# Patient Record
Sex: MALE | Race: OTHER | HISPANIC OR LATINO | URBAN - METROPOLITAN AREA
[De-identification: names, ages, dates, MRNs, and addresses within clinical notes are randomized per-mention and may not be internally consistent; named-entity substitution may affect disease eponyms.]

---

## 2018-12-03 ENCOUNTER — EMERGENCY (EMERGENCY)
Facility: HOSPITAL | Age: 63
LOS: 1 days | Discharge: HOME | End: 2018-12-03
Attending: EMERGENCY MEDICINE
Payer: SELF-PAY

## 2018-12-03 VITALS
WEIGHT: 149.03 LBS | OXYGEN SATURATION: 98 % | HEIGHT: 64 IN | RESPIRATION RATE: 18 BRPM | SYSTOLIC BLOOD PRESSURE: 161 MMHG | HEART RATE: 80 BPM | TEMPERATURE: 98 F | DIASTOLIC BLOOD PRESSURE: 87 MMHG

## 2018-12-03 PROCEDURE — 90715 TDAP VACCINE 7 YRS/> IM: CPT

## 2018-12-03 PROCEDURE — 99283 EMERGENCY DEPT VISIT LOW MDM: CPT | Mod: 25

## 2018-12-03 PROCEDURE — 12002 RPR S/N/AX/GEN/TRNK2.6-7.5CM: CPT

## 2018-12-03 PROCEDURE — 90471 IMMUNIZATION ADMIN: CPT

## 2018-12-03 PROCEDURE — T1013: CPT

## 2018-12-03 RX ORDER — TETANUS TOXOID, REDUCED DIPHTHERIA TOXOID AND ACELLULAR PERTUSSIS VACCINE, ADSORBED 5; 2.5; 8; 8; 2.5 [IU]/.5ML; [IU]/.5ML; UG/.5ML; UG/.5ML; UG/.5ML
0.5 SUSPENSION INTRAMUSCULAR ONCE
Qty: 0 | Refills: 0 | Status: COMPLETED | OUTPATIENT
Start: 2018-12-03 | End: 2018-12-03

## 2018-12-03 RX ADMIN — TETANUS TOXOID, REDUCED DIPHTHERIA TOXOID AND ACELLULAR PERTUSSIS VACCINE, ADSORBED 0.5 MILLILITER(S): 5; 2.5; 8; 8; 2.5 SUSPENSION INTRAMUSCULAR at 11:11

## 2018-12-03 NOTE — ED STATDOCS - PROGRESS NOTE DETAILS
history and physical performed by intake physician - results reviewed and case discussed with attending   lac repair, tetanus shot

## 2018-12-03 NOTE — ED STATDOCS - NS_ ATTENDINGSCRIBEDETAILS _ED_A_ED_FT
I, Triston Leyva, performed the initial face to face bedside interview with this patient regarding history of present illness, review of symptoms and relevant past medical, social and family history.  I completed an independent physical examination.  I was the provider who initially evaluated this patient.  The history, relevant review of systems, past medical and surgical history, medical decision making, and physical examination was documented by the scribe in my presence and I attest to the accuracy of the documentation. Follow-up on ordered tests (ie labs, radiologic studies) and re-evaluation of the patient's status has been communicated to the ACP.  Disposition of the patient will be based on test outcome and response to ED interventions.

## 2018-12-03 NOTE — ED STATDOCS - OBJECTIVE STATEMENT
62 y/o M pt presents to the ED c/o laceration. Pt was using a knife at work today when he cut his finger. Pt denies fever and chills. No further complaints at this time. 62 y/o M pt presents to the ED c/o laceration. Pt was using a knife at work today when he cut his thumb. He was cutting boxes at work. he is right-handed. Tetanus not UTD. Pt denies fever and chills. No further complaints at this time.    : Kasey 64 y/o M pt presents to the ED c/o laceration.  cut his hand while opening boxes.  Right-hand dominant. Tetanus not UTD. No further complaints at this time.    : Kasey

## 2020-11-17 ENCOUNTER — INPATIENT (INPATIENT)
Facility: HOSPITAL | Age: 65
LOS: 1 days | Discharge: HOME | DRG: 66 | End: 2020-11-19
Attending: HOSPITALIST | Admitting: HOSPITALIST
Payer: MEDICARE

## 2020-11-17 VITALS
WEIGHT: 179.9 LBS | RESPIRATION RATE: 20 BRPM | HEIGHT: 64 IN | SYSTOLIC BLOOD PRESSURE: 161 MMHG | OXYGEN SATURATION: 98 % | DIASTOLIC BLOOD PRESSURE: 83 MMHG | HEART RATE: 73 BPM | TEMPERATURE: 99 F

## 2020-11-17 DIAGNOSIS — Z98.890 OTHER SPECIFIED POSTPROCEDURAL STATES: Chronic | ICD-10-CM

## 2020-11-17 DIAGNOSIS — I63.9 CEREBRAL INFARCTION, UNSPECIFIED: ICD-10-CM

## 2020-11-17 LAB
A1C WITH ESTIMATED AVERAGE GLUCOSE RESULT: 5.7 % — HIGH (ref 4–5.6)
ALBUMIN SERPL ELPH-MCNC: 4.5 G/DL — SIGNIFICANT CHANGE UP (ref 3.3–5.2)
ALP SERPL-CCNC: 135 U/L — HIGH (ref 40–120)
ALT FLD-CCNC: 40 U/L — SIGNIFICANT CHANGE UP
ANION GAP SERPL CALC-SCNC: 10 MMOL/L — SIGNIFICANT CHANGE UP (ref 5–17)
APTT BLD: 36.6 SEC — HIGH (ref 27.5–35.5)
AST SERPL-CCNC: 32 U/L — SIGNIFICANT CHANGE UP
BASOPHILS # BLD AUTO: 0.03 K/UL — SIGNIFICANT CHANGE UP (ref 0–0.2)
BASOPHILS NFR BLD AUTO: 0.5 % — SIGNIFICANT CHANGE UP (ref 0–2)
BILIRUB SERPL-MCNC: 0.6 MG/DL — SIGNIFICANT CHANGE UP (ref 0.4–2)
BLD GP AB SCN SERPL QL: SIGNIFICANT CHANGE UP
BUN SERPL-MCNC: 21 MG/DL — HIGH (ref 8–20)
CALCIUM SERPL-MCNC: 9 MG/DL — SIGNIFICANT CHANGE UP (ref 8.6–10.2)
CHLORIDE SERPL-SCNC: 104 MMOL/L — SIGNIFICANT CHANGE UP (ref 98–107)
CO2 SERPL-SCNC: 24 MMOL/L — SIGNIFICANT CHANGE UP (ref 22–29)
CREAT SERPL-MCNC: 0.67 MG/DL — SIGNIFICANT CHANGE UP (ref 0.5–1.3)
EOSINOPHIL # BLD AUTO: 0.12 K/UL — SIGNIFICANT CHANGE UP (ref 0–0.5)
EOSINOPHIL NFR BLD AUTO: 2.2 % — SIGNIFICANT CHANGE UP (ref 0–6)
ESTIMATED AVERAGE GLUCOSE: 117 MG/DL — HIGH (ref 68–114)
GLUCOSE BLDC GLUCOMTR-MCNC: 114 MG/DL — HIGH (ref 70–99)
GLUCOSE SERPL-MCNC: 123 MG/DL — HIGH (ref 70–99)
HCT VFR BLD CALC: 46.2 % — SIGNIFICANT CHANGE UP (ref 39–50)
HGB BLD-MCNC: 16 G/DL — SIGNIFICANT CHANGE UP (ref 13–17)
IMM GRANULOCYTES NFR BLD AUTO: 0.4 % — SIGNIFICANT CHANGE UP (ref 0–1.5)
INR BLD: 1.04 RATIO — SIGNIFICANT CHANGE UP (ref 0.88–1.16)
LYMPHOCYTES # BLD AUTO: 1.99 K/UL — SIGNIFICANT CHANGE UP (ref 1–3.3)
LYMPHOCYTES # BLD AUTO: 35.7 % — SIGNIFICANT CHANGE UP (ref 13–44)
MCHC RBC-ENTMCNC: 30.5 PG — SIGNIFICANT CHANGE UP (ref 27–34)
MCHC RBC-ENTMCNC: 34.6 GM/DL — SIGNIFICANT CHANGE UP (ref 32–36)
MCV RBC AUTO: 88 FL — SIGNIFICANT CHANGE UP (ref 80–100)
MONOCYTES # BLD AUTO: 0.5 K/UL — SIGNIFICANT CHANGE UP (ref 0–0.9)
MONOCYTES NFR BLD AUTO: 9 % — SIGNIFICANT CHANGE UP (ref 2–14)
NEUTROPHILS # BLD AUTO: 2.91 K/UL — SIGNIFICANT CHANGE UP (ref 1.8–7.4)
NEUTROPHILS NFR BLD AUTO: 52.2 % — SIGNIFICANT CHANGE UP (ref 43–77)
PLATELET # BLD AUTO: 183 K/UL — SIGNIFICANT CHANGE UP (ref 150–400)
POTASSIUM SERPL-MCNC: 4.3 MMOL/L — SIGNIFICANT CHANGE UP (ref 3.5–5.3)
POTASSIUM SERPL-SCNC: 4.3 MMOL/L — SIGNIFICANT CHANGE UP (ref 3.5–5.3)
PROT SERPL-MCNC: 7.7 G/DL — SIGNIFICANT CHANGE UP (ref 6.6–8.7)
PROTHROM AB SERPL-ACNC: 12 SEC — SIGNIFICANT CHANGE UP (ref 10.6–13.6)
RBC # BLD: 5.25 M/UL — SIGNIFICANT CHANGE UP (ref 4.2–5.8)
RBC # FLD: 13.4 % — SIGNIFICANT CHANGE UP (ref 10.3–14.5)
SARS-COV-2 RNA SPEC QL NAA+PROBE: SIGNIFICANT CHANGE UP
SODIUM SERPL-SCNC: 138 MMOL/L — SIGNIFICANT CHANGE UP (ref 135–145)
TROPONIN T SERPL-MCNC: <0.01 NG/ML — SIGNIFICANT CHANGE UP (ref 0–0.06)
WBC # BLD: 5.57 K/UL — SIGNIFICANT CHANGE UP (ref 3.8–10.5)
WBC # FLD AUTO: 5.57 K/UL — SIGNIFICANT CHANGE UP (ref 3.8–10.5)

## 2020-11-17 PROCEDURE — 99223 1ST HOSP IP/OBS HIGH 75: CPT

## 2020-11-17 PROCEDURE — 99291 CRITICAL CARE FIRST HOUR: CPT

## 2020-11-17 PROCEDURE — 0042T: CPT

## 2020-11-17 PROCEDURE — 70496 CT ANGIOGRAPHY HEAD: CPT | Mod: 26

## 2020-11-17 PROCEDURE — 70498 CT ANGIOGRAPHY NECK: CPT | Mod: 26

## 2020-11-17 PROCEDURE — 93010 ELECTROCARDIOGRAM REPORT: CPT

## 2020-11-17 RX ORDER — GLUCAGON INJECTION, SOLUTION 0.5 MG/.1ML
1 INJECTION, SOLUTION SUBCUTANEOUS ONCE
Refills: 0 | Status: DISCONTINUED | OUTPATIENT
Start: 2020-11-17 | End: 2020-11-19

## 2020-11-17 RX ORDER — INSULIN LISPRO 100/ML
VIAL (ML) SUBCUTANEOUS
Refills: 0 | Status: DISCONTINUED | OUTPATIENT
Start: 2020-11-17 | End: 2020-11-19

## 2020-11-17 RX ORDER — DEXTROSE 50 % IN WATER 50 %
25 SYRINGE (ML) INTRAVENOUS ONCE
Refills: 0 | Status: DISCONTINUED | OUTPATIENT
Start: 2020-11-17 | End: 2020-11-19

## 2020-11-17 RX ORDER — ASPIRIN/CALCIUM CARB/MAGNESIUM 324 MG
325 TABLET ORAL DAILY
Refills: 0 | Status: DISCONTINUED | OUTPATIENT
Start: 2020-11-17 | End: 2020-11-17

## 2020-11-17 RX ORDER — SODIUM CHLORIDE 9 MG/ML
1000 INJECTION, SOLUTION INTRAVENOUS
Refills: 0 | Status: DISCONTINUED | OUTPATIENT
Start: 2020-11-17 | End: 2020-11-19

## 2020-11-17 RX ORDER — ATORVASTATIN CALCIUM 80 MG/1
80 TABLET, FILM COATED ORAL AT BEDTIME
Refills: 0 | Status: DISCONTINUED | OUTPATIENT
Start: 2020-11-17 | End: 2020-11-19

## 2020-11-17 RX ORDER — ASPIRIN/CALCIUM CARB/MAGNESIUM 324 MG
325 TABLET ORAL ONCE
Refills: 0 | Status: DISCONTINUED | OUTPATIENT
Start: 2020-11-17 | End: 2020-11-17

## 2020-11-17 RX ORDER — ASPIRIN/CALCIUM CARB/MAGNESIUM 324 MG
81 TABLET ORAL DAILY
Refills: 0 | Status: DISCONTINUED | OUTPATIENT
Start: 2020-11-17 | End: 2020-11-19

## 2020-11-17 RX ORDER — ACETAMINOPHEN 500 MG
650 TABLET ORAL EVERY 6 HOURS
Refills: 0 | Status: DISCONTINUED | OUTPATIENT
Start: 2020-11-17 | End: 2020-11-19

## 2020-11-17 RX ORDER — CLOPIDOGREL BISULFATE 75 MG/1
75 TABLET, FILM COATED ORAL DAILY
Refills: 0 | Status: DISCONTINUED | OUTPATIENT
Start: 2020-11-17 | End: 2020-11-19

## 2020-11-17 RX ORDER — INFLUENZA VIRUS VACCINE 15; 15; 15; 15 UG/.5ML; UG/.5ML; UG/.5ML; UG/.5ML
0.5 SUSPENSION INTRAMUSCULAR ONCE
Refills: 0 | Status: COMPLETED | OUTPATIENT
Start: 2020-11-17 | End: 2020-11-17

## 2020-11-17 RX ORDER — DEXTROSE 50 % IN WATER 50 %
15 SYRINGE (ML) INTRAVENOUS ONCE
Refills: 0 | Status: DISCONTINUED | OUTPATIENT
Start: 2020-11-17 | End: 2020-11-19

## 2020-11-17 RX ORDER — DEXTROSE 50 % IN WATER 50 %
12.5 SYRINGE (ML) INTRAVENOUS ONCE
Refills: 0 | Status: DISCONTINUED | OUTPATIENT
Start: 2020-11-17 | End: 2020-11-19

## 2020-11-17 RX ADMIN — ATORVASTATIN CALCIUM 80 MILLIGRAM(S): 80 TABLET, FILM COATED ORAL at 21:16

## 2020-11-17 RX ADMIN — CLOPIDOGREL BISULFATE 75 MILLIGRAM(S): 75 TABLET, FILM COATED ORAL at 19:20

## 2020-11-17 RX ADMIN — Medication 81 MILLIGRAM(S): at 21:16

## 2020-11-17 NOTE — ED PROVIDER NOTE - ATTENDING CONTRIBUTION TO CARE
The patient seen and examined    Stroke    I, Billy Wick, performed the initial face to face bedside interview with this patient regarding history of present illness, review of symptoms and relevant past medical, social and family history.  I completed an independent physical examination.  I was the initial provider who evaluated this patient. I have signed out the follow up of any pending tests (i.e. labs, radiological studies) to the resident.  I have communicated the patient’s plan of care and disposition with the resident.

## 2020-11-17 NOTE — H&P ADULT - NSHPREVIEWOFSYSTEMS_GEN_ALL_CORE
REVIEW OF SYSTEMS:    CONSTITUTIONAL: No fever, weight loss, or fatigue  EYES: No eye pain, visual disturbances, or discharge  ENMT: left facial droop   NECK: No pain or stiffness  BREASTS: No pain, masses, or nipple discharge  RESPIRATORY: No cough, wheezing, chills or hemoptysis; No shortness of breath  CARDIOVASCULAR: No chest pain, palpitations, dizziness, or leg swelling  GASTROINTESTINAL: No abdominal or epigastric pain. No nausea, vomiting, or hematemesis; No diarrhea or constipation. No melena or hematochezia.  GENITOURINARY: No dysuria, frequency, hematuria, or incontinence  NEUROLOGICAL: No headaches, memory loss, loss of strength, numbness, or tremors  SKIN: No itching, burning, rashes, or lesions   LYMPH NODES: No enlarged glands  ENDOCRINE: No heat or cold intolerance; No hair loss  MUSCULOSKELETAL: No joint pain or swelling; No muscle, back, or extremity pain  PSYCHIATRIC: No depression, anxiety, mood swings, or difficulty sleeping  HEME/LYMPH: No easy bruising, or bleeding gums  ALLERY AND IMMUNOLOGIC: No hives or eczema

## 2020-11-17 NOTE — CONSULT NOTE ADULT - SUBJECTIVE AND OBJECTIVE BOX
CHIEF COMPLAINT: rightr facial droop    HPI: 65yMale  · HPI Objective Statement: The patient is a 65 year old male presents with right sided facial droop and dysarthria and unable to swallow since 6:30 AM today.  	Not on any anticoagulation medications  	No HA, No CP, No SOB, No abd pain  To me he reports  symptoms began last and noted when he got up this am that the Right side of his face was drroping, lips wer weak  No sensation changes. No involvement of the limbs    PAST MEDICAL & SURGICAL HISTORY:  No pertinent past medical history      MEDICATIONS  (STANDING):  aspirin 325 milliGRAM(s) Oral Once  atorvastatin 80 milliGRAM(s) Oral at bedtime  dextrose 40% Gel 15 Gram(s) Oral once  dextrose 5%. 1000 milliLiter(s) (50 mL/Hr) IV Continuous <Continuous>  dextrose 5%. 1000 milliLiter(s) (100 mL/Hr) IV Continuous <Continuous>  dextrose 50% Injectable 25 Gram(s) IV Push once  dextrose 50% Injectable 12.5 Gram(s) IV Push once  dextrose 50% Injectable 25 Gram(s) IV Push once  glucagon  Injectable 1 milliGRAM(s) IntraMuscular once  insulin lispro (ADMELOG) corrective regimen sliding scale   SubCutaneous three times a day before meals    MEDICATIONS  (PRN):    Allergies    No Known Allergies    Intolerances        FAMILY HISTORY:          SOCIAL HISTORY:    Tobacco:  no  Alcohol:  no  Drugs:  no        REVIEW OF SYSTEMS:    Relevant systems are negative except as noted in the chart, HPI, and PMH      VITAL SIGNS:  Vital Signs Last 24 Hrs  T(C): 37.2 (17 Nov 2020 13:38), Max: 37.2 (17 Nov 2020 13:38)  T(F): 98.9 (17 Nov 2020 13:38), Max: 98.9 (17 Nov 2020 13:38)  HR: 77 (17 Nov 2020 14:03) (73 - 77)  BP: 159/89 (17 Nov 2020 14:03) (154/79 - 161/83)  BP(mean): --  RR: 18 (17 Nov 2020 14:03) (18 - 20)  SpO2: 96% (17 Nov 2020 14:03) (96% - 98%)    PHYSICAL EXAMINATION:    General: Well-developed, well nourished, in no acute distress.  Cardiac:  Regular rate and rhythm. No carotid bruits appreciated.  Eyes: Fundoscopic examination was deferred.  Neurologic:  - Mental Status:  Alert, awake, oriented to person, place, and time; Speech is fluent. Language is normal. Follows commands well.  Insight and knowledge appear appropriate.  Cranial Nerves II-XII:    II:  Visual acuity is normal for age ; Visual fields are full to confrontation; Pupils are equal, round, and reactive to light.  III, IV, VI:  Extraocular movements are intact without nystagmus.  V:  Facial sensation is intact in the V1-V3 distribution bilaterally.  VII:  Face waekness of the right side of the lips. Mild NLF , mild forehead weakness  VIII:  Hearing is grossly intact  IX, X, XII:  speech is clear  XI:  Head turning and shoulder shrug are intact.  - Motor:  Strength is 5/5 x 4.   There is no pronator drift. .  - Reflexes:  2+ and symmetric at the knees.  Plantar responses flexor.  - Sensory:  Symmetric to light touch  - Coordination:  Finger-nose-finger is normal. Rapid alternating hand and foot  movements are intact. Dexterity appears normal      LABS:                          16.0   5.57  )-----------( 183      ( 17 Nov 2020 13:20 )             46.2     17 Nov 2020 13:20    138    |  104    |  21.0   ----------------------------<  123    4.3     |  24.0   |  0.67     Ca    9.0        17 Nov 2020 13:20    TPro  7.7    /  Alb  4.5    /  TBili  0.6    /  DBili  x      /  AST  32     /  ALT  40     /  AlkPhos  135    17 Nov 2020 13:20    LIVER FUNCTIONS - ( 17 Nov 2020 13:20 )  Alb: 4.5 g/dL / Pro: 7.7 g/dL / ALK PHOS: 135 U/L / ALT: 40 U/L / AST: 32 U/L / GGT: x           PT/INR - ( 17 Nov 2020 13:20 )   PT: 12.0 sec;   INR: 1.04 ratio         PTT - ( 17 Nov 2020 13:20 )  PTT:36.6 sec      RADIOLOGY & ADDITIONAL STUDIES:      < from: CT Brain Stroke Protocol (11.17.20 @ 13:21) >  CT angiography:    NECK:  The aortic arch is conventional in anatomy. Origin of supraaortic arteries are patent. The common carotid arteries are normal in course and caliber. There are atherosclerotic plaques at common carotid bifurcations and carotid bulbs without evidence of significant segmental stenosis.    The right internal carotid artery is normal in caliber. There is 0 % stenosis using NASCET criteria (normal right ICA caliber is 4.4 mm).    The left internal carotid artery is normal in caliber. There is 0 % stenosis using NASCET criteria (normal left ICA caliber is 4.3 mm).    Bilateral vertebral arteries are normal in course, caliber and contour, without evidence of dissection or occlusion.    Degenerative changes of the bony cervical spine are noted.    BRAIN:    The petrocavernous and supraclinoid ICA segments are normal in caliber. There is high-grade narrowing/occlusion of the entire right MCA from the carotid terminus. The visualized left MCA and IVANNA branches are normal without evidence of high-grade stenosis or aneurysm. There is mild narrowing of the left MCA. There is mild to moderate narrowing of the proximal left M2 branchat the bifurcation. There is mild narrowing of the left pericallosal A2. The ACOM is present. Bilateral posterior communicating arteries are present.  The vertebral arteries, visualized cerebellar arteries, basilar and bilateral posterior cerebral arteries are patent without evidence of stenoses or aneurysm.    Other findings:    CT Perfusion:    The regional cerebral blood flow (CBF), and time to maximum (Tmax) for the IVANNA, MCA, and PCA territories are within normal limits. There is asymmetric increased regional cerebral blood volume in the right operculum compared to the left. There is no evidence of asymmetric or delayed territorial perfusion. The arteriovenous curves demonstrate low peak and slope the arterial curve, probably secondary toplacement in the M2 branch of the left MCA rather than in the more proximal left MCA circulation.      IMPRESSION:    CT brain: There is suspicion for an acute infarct in the right subinsular cortex and right gangliocapsular region. No intracranial hemorrhage, midline shift or herniation.    < end of copied text >    IMPRESSION:    Exam is consistent with a Right [place] Palsy.  CT findings seem to be coincidental with Right MCA occulsion and ??right ischemia.      PLAN:  1. Needs MRI brain with and without rani- r/o cva. bells palsy  2. Needs MRA to  confirm rt MCA findings' on CT  3. Consider course of steroids for the [place] Palsty  4. Antiplatelet therapy as prescribed.  5. Cerebrovascular risk factor assessment and management  6. Medical and Cardiac evaluation and treatment as indicated

## 2020-11-17 NOTE — ED PROVIDER NOTE - CLINICAL SUMMARY MEDICAL DECISION MAKING FREE TEXT BOX
The patient is a 65 year old male presents with right sided facial droop, will get CT and stroke workup.

## 2020-11-17 NOTE — CONSULT NOTE ADULT - SUBJECTIVE AND OBJECTIVE BOX
HPI: This is  a 65y Male with PMH of HLD, presented to ED w cc  of right sided facial droop and a feeling of lip tingling that started this morning ~ 6:30 AM while he was shaving. ED reported he had difficulties swallowing and dysarthria but both patient and wife deny this to me. CTH showed suspicion for acute infarct in the right subinsular cortex and right ganglio capsular region. Neuro IR team consulted for reading of possible R M1 occlusion on CTA.  CT perfusion scan wnl.  At the time of my exam, facial droop has resolved, patient has no sensory deficit, reports lip feels "weak" on right side.       PAST MEDICAL & SURGICAL HISTORY:  Hyperlipidemia    H/O shoulder surgery    FAMILY HISTORY:  FH: hypertension  father    SOCIAL HISTORY:  Tobacco Use:  EtOH use:   Substance:    Allergies    No Known Allergies    REVIEW OF SYSTEMS  CONSTITUTIONAL: No fever, weight loss, or fatigue  EYES: No eye pain, visual disturbances, or discharge  ENMT:  Lip "weakness" No difficulty hearing, tinnitus, vertigo; No sinus or throat pain  NECK: No pain or stiffness  BREASTS: No pain, masses, or nipple discharge  RESPIRATORY: No cough, wheezing, chills or hemoptysis; No shortness of breath  CARDIOVASCULAR: No chest pain, palpitations, dizziness, or leg swelling  GASTROINTESTINAL: No abdominal or epigastric pain. No nausea, vomiting, or hematemesis; No diarrhea or constipation. No melena or hematochezia.  GENITOURINARY: No dysuria, frequency, hematuria, or incontinence  NEUROLOGICAL: No headaches, memory loss, loss of strength, numbness, or tremors  SKIN: No itching, burning, rashes, or lesions   LYMPH NODES: No enlarged glands  ENDOCRINE: No heat or cold intolerance; No hair loss  MUSCULOSKELETAL: No joint pain or swelling; No muscle, back, or extremity pain  PSYCHIATRIC: No depression, anxiety, mood swings, or difficulty sleeping  HEME/LYMPH: No easy bruising, or bleeding gums  ALLERY AND IMMUNOLOGIC: No hives or eczema    HOME MEDICATIONS:  atorvastatin 40 mg oral tablet: 1 tab(s) orally once a day (at bedtime) (17 Nov 2020 14:18)    Neuro:  acetaminophen   Tablet .. 650 milliGRAM(s) Oral every 6 hours PRN  aspirin 325 milliGRAM(s) Oral daily    Anticoagulation:    OTHER:  atorvastatin 80 milliGRAM(s) Oral at bedtime  dextrose 40% Gel 15 Gram(s) Oral once  dextrose 50% Injectable 25 Gram(s) IV Push once  dextrose 50% Injectable 12.5 Gram(s) IV Push once  dextrose 50% Injectable 25 Gram(s) IV Push once  glucagon  Injectable 1 milliGRAM(s) IntraMuscular once  insulin lispro (ADMELOG) corrective regimen sliding scale   SubCutaneous three times a day before meals  predniSONE   Tablet 60 milliGRAM(s) Oral daily      Vital Signs Last 24 Hrs  T(C): 37.2 (17 Nov 2020 13:38), Max: 37.2 (17 Nov 2020 13:38)  T(F): 98.9 (17 Nov 2020 13:38), Max: 98.9 (17 Nov 2020 13:38)  HR: 77 (17 Nov 2020 14:03) (73 - 77)  BP: 159/89 (17 Nov 2020 14:03) (154/79 - 161/83)  BP(mean): --  RR: 18 (17 Nov 2020 14:03) (18 - 20)  SpO2: 96% (17 Nov 2020 14:03) (96% - 98%)      PHYSICAL EXAM:  1A: Level of consciousness       0= Alert; keenly responsive         1B: Ask month and age       0= Both questions right       1C: "Blink eyes" and "Squeeze Hands"       0= Performs both         2: Horizontal EOMs       0= Normal        3: Visual fields       0= No visual loss       4: Facial palsy (use grimace if obtunded)       0= Normal symmetry         5A: Left arm motor drift (count out loud and use fingers to show count)       0= No drift x 10 seconds       5B: Right arm motor drift       0= No drift x 10 seconds         6A: Left leg motor drift       0= No drift x 10 seconds        6B: Right leg motor drift       0= No drift x 10 seconds        7: Limb ataxia (FNF/heel-shin)       0= No ataxia        8: Sensation       0= Normal, no sensory loss        9: Language/aphasia- describe the scene (on christophe); name the items; read the sentences (on christophe)       0= Normal, no aphasia         10: Dysarthria- read the words       0= Normal         11: Extinction/inattention       0= No abnormality         Total Score: 0     Detailed Neurologic Exam:    Mental status: The patient is awake and alert and has normal attention span.  The patient is fully oriented in 3 spheres. The patient is oriented to current events. The patient is able to name objects, follow commands, repeat sentences.  Cranial nerves: Pupils equal and react symmetrically to light. There is no visual field deficit to confrontation. Extraocular motion is full with no nystagmus. There is no ptosis. Facial sensation is intact. Facial musculature is symmetric. Palate elevates symmetrically. Shoulder shrug is normal. Tongue is midline.  Motor: There is normal bulk and tone.  There is no tremor.  Strength is 5/5 in the right arm and leg.   Strength is 5/5 in the left arm and leg.  Sensation: Intact to light touch in 4 extremities.l  Cerebellar: There is no dysmetria on finger to nose testing.  Gait : deferred    GENERAL: NAD, well-groomed  HEAD:  Atraumatic, normocephalic  DRAINS:   WOUND: Dressing clean dry intact; well healed  SHUNT: easily compressible and refills  EYES: Conjunctiva and sclera clear; corneal reflex intact  ENMT: No tonsillar erythema, exudates, or enlargement; moist mucous membranes, good dentition, no lesions  NECK: Supple, no JVD, dormal thyroid  CHEST/LUNG: Clear to auscultation bilaterally; no rales, rhonchi, wheezing, or rubs  HEART: +S1/+S2; Regular rate and rhythm; no murmurs, rubs, or gallops  ABDOMEN: Soft, nontender, nondistended; bowel sounds present all four quadrants  EXTREMITIES:  2+ peripheral pulses, no clubbing, cyanosis, or edema  LYMPH: No lymphadenopathy noted  SKIN: Warm, dry; no rashes or lesions    LABS:                        16.0   5.57  )-----------( 183      ( 17 Nov 2020 13:20 )             46.2     11-17    138  |  104  |  21.0<H>  ----------------------------<  123<H>  4.3   |  24.0  |  0.67    Ca    9.0      17 Nov 2020 13:20    TPro  7.7  /  Alb  4.5  /  TBili  0.6  /  DBili  x   /  AST  32  /  ALT  40  /  AlkPhos  135<H>  11-17    PT/INR - ( 17 Nov 2020 13:20 )   PT: 12.0 sec;   INR: 1.04 ratio         PTT - ( 17 Nov 2020 13:20 )  PTT:36.6 sec        RADIOLOGY & ADDITIONAL STUDIES:      < from: CT Brain Stroke Protocol (11.17.20 @ 13:21) >    IMPRESSION:    CT brain: There is suspicion for an acute infarct in the right subinsular cortex and right gangliocapsular region. No intracranial hemorrhage, midline shift or herniation.    Right ASPECT: 8    Left ASPECT: 10    CTA brain: There is high-grade narrowing/occlusion of the right MCA just distal to terminus. There is relative preservation of several distal right M2 branches.      < end of copied text >

## 2020-11-17 NOTE — ED PROVIDER NOTE - PSYCHIATRIC, MLM
Total Body Time: 216 Alert and oriented to person, place, time/situation. normal mood and affect. no apparent risk to self or others.

## 2020-11-17 NOTE — ED ADULT TRIAGE NOTE - CHIEF COMPLAINT QUOTE
Pt arrives to ED c/o R facial drop and numbness started approx. 0630 this morning Code Stroke initiated

## 2020-11-17 NOTE — CONSULT NOTE ADULT - SUBJECTIVE AND OBJECTIVE BOX
[facility]                                   [address] [place] [address]                                                      PHONE: [phone]                                                         FAX: [phone]  [email or web address]  ---------------------------------------------------------------------------------------------------------------------------------    Reason for Consult: CVA     HPI:  ASHLEY GAXIOLA is an 65y Male with history of HLD none smoker who presents to ED C/O of right sided facial droop and dysarthria that started this morning ~ 6:30 AM.  Patient had a difficulties time swallowing also.  The Patient well and denies any chest pain orthopnea or PND.      PAST MEDICAL & SURGICAL HISTORY:  Hyperlipidemia    H/O shoulder surgery        No Known Allergies      MEDICATIONS  (STANDING):  aspirin 325 milliGRAM(s) Oral daily  atorvastatin 80 milliGRAM(s) Oral at bedtime  dextrose 40% Gel 15 Gram(s) Oral once  dextrose 5%. 1000 milliLiter(s) (50 mL/Hr) IV Continuous <Continuous>  dextrose 5%. 1000 milliLiter(s) (100 mL/Hr) IV Continuous <Continuous>  dextrose 50% Injectable 25 Gram(s) IV Push once  dextrose 50% Injectable 12.5 Gram(s) IV Push once  dextrose 50% Injectable 25 Gram(s) IV Push once  glucagon  Injectable 1 milliGRAM(s) IntraMuscular once  insulin lispro (ADMELOG) corrective regimen sliding scale   SubCutaneous three times a day before meals  predniSONE   Tablet 60 milliGRAM(s) Oral daily    MEDICATIONS  (PRN):  acetaminophen   Tablet .. 650 milliGRAM(s) Oral every 6 hours PRN Temp greater or equal to 38C (100.4F), Mild Pain (1 - 3)      Social History:  Cigarettes:           none          Alchohol:      none            Illicit Drug Abuse:  none     ROS: Negative other than as mentioned in HPI.    Vital Signs Last 24 Hrs  T(C): 37.2 (17 Nov 2020 13:38), Max: 37.2 (17 Nov 2020 13:38)  T(F): 98.9 (17 Nov 2020 13:38), Max: 98.9 (17 Nov 2020 13:38)  HR: 77 (17 Nov 2020 14:03) (73 - 77)  BP: 159/89 (17 Nov 2020 14:03) (154/79 - 161/83)  BP(mean): --  RR: 18 (17 Nov 2020 14:03) (18 - 20)  SpO2: 96% (17 Nov 2020 14:03) (96% - 98%)  ICU Vital Signs Last 24 Hrs  ASHLEY VERA  I&O's Detail    I&O's Summary    Drug Dosing Weight  ASHLEY VERA      PHYSICAL EXAM:  General: Appears well developed, well nourished alert and cooperative.  HEENT: Head; normocephalic, atraumatic.  Eyes: Pupils reactive, cornea wnl.  Neck: Supple, no nodes adenopathy, no NVD or carotid bruit or thyromegaly.  CARDIOVASCULAR: Normal S1 and S2, No murmur, rub, gallop or lift.   LUNGS: No rales, rhonchi or wheeze. Normal breath sounds bilaterally.  ABDOMEN: Soft, nontender without mass or organomegaly. bowel sounds normoactive.  EXTREMITIES: No clubbing, cyanosis or edema. Distal pulses wnl.   SKIN: warm and dry with normal turgor.  NEURO: Alert/oriented x 3/normal motor exam. No pathologic reflexes.    PSYCH: normal affect.        LABS:                        16.0   5.57  )-----------( 183      ( 17 Nov 2020 13:20 )             46.2     11-17    138  |  104  |  21.0<H>  ----------------------------<  123<H>  4.3   |  24.0  |  0.67    Ca    9.0      17 Nov 2020 13:20    TPro  7.7  /  Alb  4.5  /  TBili  0.6  /  DBili  x   /  AST  32  /  ALT  40  /  AlkPhos  135<H>  11-17    ASHLEY VERA  CARDIAC MARKERS ( 17 Nov 2020 13:20 )  x     / <0.01 ng/mL / x     / x     / x          PT/INR - ( 17 Nov 2020 13:20 )   PT: 12.0 sec;   INR: 1.04 ratio         PTT - ( 17 Nov 2020 13:20 )  PTT:36.6 sec    RADIOLOGY & ADDITIONAL STUDIES:    INTERPRETATION OF TELEMETRY (personally reviewed): NSR     ECG: Pending     ECHO: pending   IMPRESSION:    CT brain: There is suspicion for an acute infarct in the right subinsular cortex and right gangliocapsular region. No intracranial hemorrhage, midline shift or herniation.    Assessment and Plan:  In summary, ASHLEY GAXIOLA is an 65y Male with past medical history significant for history of HLD none smoker who presents to ED C/O of right sided facial droop and dysarthria that started this morning ~ 6:30 AM.  Patient had a difficulties time swallowing also.  The Patient well and denies any chest pain orthopnea or PND.  CT head showed suspicion for acute infrarct in the right subinsular cortex and right ganglio capsular region.     Plan  1.  Admit to TELE   2.  Check EKG   3.  Awaiting TTE to evaluate LV EF   4.  Awaiting brain MRI and MRA   5.  Neurology follow up   6.  May need COLIN/ILR pre discharge pending above findings

## 2020-11-17 NOTE — H&P ADULT - HISTORY OF PRESENT ILLNESS
65 yom with pmh of hld on statin presents from home with right facial droop and vague feelings of inability to move his upper lip area. Patient states the symptoms started early this am associated with numbness and tingling. Patient denies any other complaints and wife was at bedside who corroborated the story. Wife denies any confusion, dysarthria and weakness of her . Patient worked up in er and found to have a nih of 3 as per records and a likley stroke on the ct head. Patient is being admitted for further work up

## 2020-11-17 NOTE — ED ADULT NURSE NOTE - OBJECTIVE STATEMENT
CODE STROKE called and brought to CC. 66 y/o m a&ox3 comes to ED c/o rt. sided facial and lip numbness starting at 630am. pt. also c/o slurred speech. pt. denies chest pain or discomfort at this time. pt. in no apparent distress at this time, breathing even and unlabored.

## 2020-11-17 NOTE — CONSULT NOTE ADULT - ASSESSMENT
Assessment: This is  a 65y Male with PMH of HLD, presented to ED w cc  of right sided facial droop and a feeling of lip tingling that started this morning ~ 6:30 AM while he was shaving. ED reported he had difficulties swallowing and dysarthria but both patient and wife deny this to me. CTH showed suspicion for acute infarct in the right subinsular cortex and right ganglio capsular region. Neuro IR team consulted for reading of possible R M1 occlusion on CTA.  CT perfusion scan wnl.  At the time of my exam, facial droop has resolved, patient has no sensory deficit, reports lip feels "weak" on right side.            Plan:   --> Reviewed imaging with Dr Brown, patient has severe diffuse intracranial atherosclerotic disease, questionable RM1 occlusion appears to be chronic narrowing   --> Rec maximum medical therapy for intracranial stenosis which is ASA and Plavix X 3 months then repeat imaging as per [facility] Study  --> Patient admitted to hospitalist service for stroke work up  --> Cardiology and general neurology following  --> Stroke Core Measures- HgA1C, LDL, TTE  --> Awaiting brain MRI and MRA   --> May need COLIN/ILR pre discharge per cardiology  --> Will continue to follow

## 2020-11-17 NOTE — H&P ADULT - ASSESSMENT
#cva - admit to step down   --> asa, plavix and statin   - neuro, and neurointerventional note appreciated  -> mri mra head and neck ordered  --> Tte  --> send hba1c and lipid panel in a   --> pt/ot/speech    #dvt prop - ambulation and scds    #diet - soft  -> patient showing no signs of dysphagia    plan of care explained to patient and wife with

## 2020-11-17 NOTE — H&P ADULT - NSHPLABSRESULTS_GEN_ALL_CORE
16.0   5.57   )----------(  183       ( 17 Nov 2020 13:20 )               46.2      138    |  104    |  21.0   ----------------------------<  123        ( 17 Nov 2020 13:20 )  4.3     |  24.0   |  0.67     Ca    9.0        ( 17 Nov 2020 13:20 )    TPro  7.7    /  Alb  4.5    /  TBili  0.6    /  DBili  x      /  AST  32     /  ALT  40     /  AlkPhos  135    ( 17 Nov 2020 13:20 )    LIVER FUNCTIONS - ( 17 Nov 2020 13:20 )  Alb: 4.5 g/dL / Pro: 7.7 g/dL / ALK PHOS: 135 U/L / ALT: 40 U/L / AST: 32 U/L / GGT: x           PT/INR -  12.0 sec / 1.04 ratio   ( 17 Nov 2020 13:20 )       PTT -  36.6 sec   ( 17 Nov 2020 13:20 )  CAPILLARY BLOOD GLUCOSE    CARDIAC MARKERS ( 17 Nov 2020 13:20 )  x     / <0.01 ng/mL / x     / x     / x          ct head - CT brain: There is suspicion for an acute infarct in the right subinsular cortex and right gangliocapsular region. No intracranial hemorrhage, midline shift or herniation.    Right ASPECT: 8    Left ASPECT: 10    CTA brain: There is high-grade narrowing/occlusion of the right MCA just distal to terminus. There is relative preservation of several distal right M2 branches.

## 2020-11-17 NOTE — H&P ADULT - NSHPPHYSICALEXAM_GEN_ALL_CORE
PHYSICAL EXAM:    GENERAL: NAD, well-groomed, well-developed  HEAD:  Atraumatic, Normocephalic  EYES: EOMI, PERRLA, conjunctiva and sclera clear  ENMT: No tonsillar erythema, exudates, or enlargement; Moist mucous membranes, Good dentition, No lesions  NECK: Supple, No JVD, Normal thyroid  NERVOUS SYSTEM:  Alert & Oriented X3, Good concentration; Motor Strength 5/5 B/L upper and lower extremities; DTRs 2+ intact and symmetric  CHEST/LUNG: Clear to percussion bilaterally; No rales, rhonchi, wheezing, or rubs  HEART: Regular rate and rhythm; No murmurs, rubs, or gallops  ABDOMEN: Soft, Nontender, Nondistended; Bowel sounds present  EXTREMITIES:  2+ Peripheral Pulses, No clubbing, cyanosis, or edema  LYMPH: No lymphadenopathy noted  SKIN: No rashes or lesions PHYSICAL EXAM:    GENERAL: NAD, well-groomed, well-developed  HEAD:  Atraumatic, Normocephalic  EYES: EOMI, PERRLA, conjunctiva and sclera clear  ENMT: subtle right facial droop  NECK: Supple, No JVD, Normal thyroid  NERVOUS SYSTEM:  Alert & Oriented X3, Good concentration; Motor Strength 5/5 B/L upper and lower extremities; sensory intact   CHEST/LUNG: Clear to percussion bilaterally; No rales, rhonchi, wheezing, or rubs  HEART: Regular rate and rhythm; No murmurs, rubs, or gallops  ABDOMEN: Soft, Nontender, Nondistended; Bowel sounds present  EXTREMITIES:  2+ Peripheral Pulses, No clubbing, cyanosis, or edema  LYMPH: No lymphadenopathy noted  SKIN: No rashes or lesions

## 2020-11-17 NOTE — ED PROVIDER NOTE - OBJECTIVE STATEMENT
The patient is a 65 year old male presents with right sided facial droop and dysarthria and unable to swallow since 6:30 AM today. The patient is a 65 year old male presents with right sided facial droop and dysarthria and unable to swallow since 6:30 AM today.  Not on any anticoagulation medications  No HA, No CP, No SOB, No abd pain  No recent surgery

## 2020-11-18 LAB
A1C WITH ESTIMATED AVERAGE GLUCOSE RESULT: 5.9 % — HIGH (ref 4–5.6)
ALBUMIN SERPL ELPH-MCNC: 4.3 G/DL — SIGNIFICANT CHANGE UP (ref 3.3–5.2)
ALP SERPL-CCNC: 101 U/L — SIGNIFICANT CHANGE UP (ref 40–120)
ALT FLD-CCNC: 40 U/L — SIGNIFICANT CHANGE UP
ANION GAP SERPL CALC-SCNC: 9 MMOL/L — SIGNIFICANT CHANGE UP (ref 5–17)
AST SERPL-CCNC: 29 U/L — SIGNIFICANT CHANGE UP
B BURGDOR C6 AB SER-ACNC: NEGATIVE — SIGNIFICANT CHANGE UP
B BURGDOR IGG+IGM SER-ACNC: 0.07 INDEX — SIGNIFICANT CHANGE UP (ref 0.01–0.89)
BASOPHILS # BLD AUTO: 0.03 K/UL — SIGNIFICANT CHANGE UP (ref 0–0.2)
BASOPHILS NFR BLD AUTO: 0.5 % — SIGNIFICANT CHANGE UP (ref 0–2)
BILIRUB SERPL-MCNC: 0.9 MG/DL — SIGNIFICANT CHANGE UP (ref 0.4–2)
BUN SERPL-MCNC: 18 MG/DL — SIGNIFICANT CHANGE UP (ref 8–20)
CALCIUM SERPL-MCNC: 8.9 MG/DL — SIGNIFICANT CHANGE UP (ref 8.6–10.2)
CHLORIDE SERPL-SCNC: 102 MMOL/L — SIGNIFICANT CHANGE UP (ref 98–107)
CHOLEST SERPL-MCNC: 215 MG/DL — HIGH
CO2 SERPL-SCNC: 26 MMOL/L — SIGNIFICANT CHANGE UP (ref 22–29)
CREAT SERPL-MCNC: 0.65 MG/DL — SIGNIFICANT CHANGE UP (ref 0.5–1.3)
EOSINOPHIL # BLD AUTO: 0.14 K/UL — SIGNIFICANT CHANGE UP (ref 0–0.5)
EOSINOPHIL NFR BLD AUTO: 2.6 % — SIGNIFICANT CHANGE UP (ref 0–6)
ESTIMATED AVERAGE GLUCOSE: 123 MG/DL — HIGH (ref 68–114)
GLUCOSE BLDC GLUCOMTR-MCNC: 104 MG/DL — HIGH (ref 70–99)
GLUCOSE BLDC GLUCOMTR-MCNC: 121 MG/DL — HIGH (ref 70–99)
GLUCOSE BLDC GLUCOMTR-MCNC: 126 MG/DL — HIGH (ref 70–99)
GLUCOSE BLDC GLUCOMTR-MCNC: 135 MG/DL — HIGH (ref 70–99)
GLUCOSE BLDC GLUCOMTR-MCNC: 137 MG/DL — HIGH (ref 70–99)
GLUCOSE SERPL-MCNC: 103 MG/DL — HIGH (ref 70–99)
HCT VFR BLD CALC: 49 % — SIGNIFICANT CHANGE UP (ref 39–50)
HCV AB S/CO SERPL IA: 0.13 S/CO — SIGNIFICANT CHANGE UP (ref 0–0.99)
HCV AB SERPL-IMP: SIGNIFICANT CHANGE UP
HDLC SERPL-MCNC: 46 MG/DL — SIGNIFICANT CHANGE UP
HGB BLD-MCNC: 16.6 G/DL — SIGNIFICANT CHANGE UP (ref 13–17)
IMM GRANULOCYTES NFR BLD AUTO: 0.2 % — SIGNIFICANT CHANGE UP (ref 0–1.5)
LIPID PNL WITH DIRECT LDL SERPL: 127 MG/DL — HIGH
LYMPHOCYTES # BLD AUTO: 1.78 K/UL — SIGNIFICANT CHANGE UP (ref 1–3.3)
LYMPHOCYTES # BLD AUTO: 32.4 % — SIGNIFICANT CHANGE UP (ref 13–44)
MCHC RBC-ENTMCNC: 30.1 PG — SIGNIFICANT CHANGE UP (ref 27–34)
MCHC RBC-ENTMCNC: 33.9 GM/DL — SIGNIFICANT CHANGE UP (ref 32–36)
MCV RBC AUTO: 88.8 FL — SIGNIFICANT CHANGE UP (ref 80–100)
MONOCYTES # BLD AUTO: 0.54 K/UL — SIGNIFICANT CHANGE UP (ref 0–0.9)
MONOCYTES NFR BLD AUTO: 9.8 % — SIGNIFICANT CHANGE UP (ref 2–14)
NEUTROPHILS # BLD AUTO: 2.99 K/UL — SIGNIFICANT CHANGE UP (ref 1.8–7.4)
NEUTROPHILS NFR BLD AUTO: 54.5 % — SIGNIFICANT CHANGE UP (ref 43–77)
NON HDL CHOLESTEROL: 169 MG/DL — HIGH
PLATELET # BLD AUTO: 185 K/UL — SIGNIFICANT CHANGE UP (ref 150–400)
POTASSIUM SERPL-MCNC: 4.1 MMOL/L — SIGNIFICANT CHANGE UP (ref 3.5–5.3)
POTASSIUM SERPL-SCNC: 4.1 MMOL/L — SIGNIFICANT CHANGE UP (ref 3.5–5.3)
PROT SERPL-MCNC: 7.4 G/DL — SIGNIFICANT CHANGE UP (ref 6.6–8.7)
RBC # BLD: 5.52 M/UL — SIGNIFICANT CHANGE UP (ref 4.2–5.8)
RBC # FLD: 13.6 % — SIGNIFICANT CHANGE UP (ref 10.3–14.5)
SODIUM SERPL-SCNC: 137 MMOL/L — SIGNIFICANT CHANGE UP (ref 135–145)
TRIGL SERPL-MCNC: 208 MG/DL — HIGH
TSH SERPL-MCNC: 0.94 UIU/ML — SIGNIFICANT CHANGE UP (ref 0.27–4.2)
WBC # BLD: 5.49 K/UL — SIGNIFICANT CHANGE UP (ref 3.8–10.5)
WBC # FLD AUTO: 5.49 K/UL — SIGNIFICANT CHANGE UP (ref 3.8–10.5)

## 2020-11-18 PROCEDURE — 99233 SBSQ HOSP IP/OBS HIGH 50: CPT

## 2020-11-18 PROCEDURE — 70544 MR ANGIOGRAPHY HEAD W/O DYE: CPT | Mod: 26,59

## 2020-11-18 PROCEDURE — 70553 MRI BRAIN STEM W/O & W/DYE: CPT | Mod: 26

## 2020-11-18 PROCEDURE — 99232 SBSQ HOSP IP/OBS MODERATE 35: CPT

## 2020-11-18 PROCEDURE — 93010 ELECTROCARDIOGRAM REPORT: CPT

## 2020-11-18 PROCEDURE — 70549 MR ANGIOGRAPH NECK W/O&W/DYE: CPT | Mod: 26

## 2020-11-18 RX ADMIN — Medication 60 MILLIGRAM(S): at 05:20

## 2020-11-18 RX ADMIN — ATORVASTATIN CALCIUM 80 MILLIGRAM(S): 80 TABLET, FILM COATED ORAL at 22:35

## 2020-11-18 RX ADMIN — Medication 81 MILLIGRAM(S): at 13:05

## 2020-11-18 RX ADMIN — CLOPIDOGREL BISULFATE 75 MILLIGRAM(S): 75 TABLET, FILM COATED ORAL at 13:05

## 2020-11-18 NOTE — PROGRESS NOTE ADULT - SUBJECTIVE AND OBJECTIVE BOX
INTERVAL HISTORY:        VITAL SIGNS:  Vital Signs Last 24 Hrs  T(C): 36.6 (18 Nov 2020 05:14), Max: 37.2 (17 Nov 2020 13:38)  T(F): 97.9 (18 Nov 2020 05:14), Max: 98.9 (17 Nov 2020 13:38)  HR: 65 (18 Nov 2020 05:14) (64 - 80)  BP: 136/69 (18 Nov 2020 05:14) (134/83 - 164/83)  BP(mean): 84 (18 Nov 2020 05:14) (84 - 84)  RR: 18 (18 Nov 2020 05:14) (16 - 20)  SpO2: 98% (18 Nov 2020 05:14) (94% - 98%)    PHYSICAL EXAMINATION:    Mentation:    Language/Speech:  CN:  Visual Fields:  Motor:  Sensory:  DTR:  Babinski:      MEDS:  MEDICATIONS  (STANDING):  aspirin enteric coated 81 milliGRAM(s) Oral daily  atorvastatin 80 milliGRAM(s) Oral at bedtime  clopidogrel Tablet 75 milliGRAM(s) Oral daily  dextrose 40% Gel 15 Gram(s) Oral once  dextrose 5%. 1000 milliLiter(s) (50 mL/Hr) IV Continuous <Continuous>  dextrose 5%. 1000 milliLiter(s) (100 mL/Hr) IV Continuous <Continuous>  dextrose 50% Injectable 25 Gram(s) IV Push once  dextrose 50% Injectable 12.5 Gram(s) IV Push once  dextrose 50% Injectable 25 Gram(s) IV Push once  glucagon  Injectable 1 milliGRAM(s) IntraMuscular once  influenza   Vaccine 0.5 milliLiter(s) IntraMuscular once  insulin lispro (ADMELOG) corrective regimen sliding scale   SubCutaneous three times a day before meals  predniSONE   Tablet 60 milliGRAM(s) Oral daily    MEDICATIONS  (PRN):  acetaminophen   Tablet .. 650 milliGRAM(s) Oral every 6 hours PRN Temp greater or equal to 38C (100.4F), Mild Pain (1 - 3)      LABS:                          16.6   5.49  )-----------( 185      ( 18 Nov 2020 06:54 )             49.0     11-18    137  |  102  |  18.0  ----------------------------<  103<H>  4.1   |  26.0  |  0.65    Ca    8.9      18 Nov 2020 06:54    TPro  7.4  /  Alb  4.3  /  TBili  0.9  /  DBili  x   /  AST  29  /  ALT  40  /  AlkPhos  101  11-18    LIVER FUNCTIONS - ( 18 Nov 2020 06:54 )  Alb: 4.3 g/dL / Pro: 7.4 g/dL / ALK PHOS: 101 U/L / ALT: 40 U/L / AST: 29 U/L / GGT: x               RADIOLOGY & ADDITIONAL STUDIES:      IMPRESSION & PLAN:    1. Right [place] Palsy  2. Possbile cerebrobvascular disease/stenosis (incidental, as seen on CTA)      Awating MRI brain with/woithout rani and MRAs  continue prednisone         INTERVAL HISTORY:  off floor for studies.. Reported to be stable      VITAL SIGNS:  Vital Signs Last 24 Hrs  T(C): 36.6 (18 Nov 2020 05:14), Max: 37.2 (17 Nov 2020 13:38)  T(F): 97.9 (18 Nov 2020 05:14), Max: 98.9 (17 Nov 2020 13:38)  HR: 65 (18 Nov 2020 05:14) (64 - 80)  BP: 136/69 (18 Nov 2020 05:14) (134/83 - 164/83)  BP(mean): 84 (18 Nov 2020 05:14) (84 - 84)  RR: 18 (18 Nov 2020 05:14) (16 - 20)  SpO2: 98% (18 Nov 2020 05:14) (94% - 98%)    PHYSICAL EXAMINATION:    Mentation:    Language/Speech:  CN:  Visual Fields:  Motor:  Sensory:  DTR:  Babinski:      MEDS:  MEDICATIONS  (STANDING):  aspirin enteric coated 81 milliGRAM(s) Oral daily  atorvastatin 80 milliGRAM(s) Oral at bedtime  clopidogrel Tablet 75 milliGRAM(s) Oral daily  dextrose 40% Gel 15 Gram(s) Oral once  dextrose 5%. 1000 milliLiter(s) (50 mL/Hr) IV Continuous <Continuous>  dextrose 5%. 1000 milliLiter(s) (100 mL/Hr) IV Continuous <Continuous>  dextrose 50% Injectable 25 Gram(s) IV Push once  dextrose 50% Injectable 12.5 Gram(s) IV Push once  dextrose 50% Injectable 25 Gram(s) IV Push once  glucagon  Injectable 1 milliGRAM(s) IntraMuscular once  influenza   Vaccine 0.5 milliLiter(s) IntraMuscular once  insulin lispro (ADMELOG) corrective regimen sliding scale   SubCutaneous three times a day before meals  predniSONE   Tablet 60 milliGRAM(s) Oral daily    MEDICATIONS  (PRN):  acetaminophen   Tablet .. 650 milliGRAM(s) Oral every 6 hours PRN Temp greater or equal to 38C (100.4F), Mild Pain (1 - 3)      LABS:                          16.6   5.49  )-----------( 185      ( 18 Nov 2020 06:54 )             49.0     11-18    137  |  102  |  18.0  ----------------------------<  103<H>  4.1   |  26.0  |  0.65    Ca    8.9      18 Nov 2020 06:54    TPro  7.4  /  Alb  4.3  /  TBili  0.9  /  DBili  x   /  AST  29  /  ALT  40  /  AlkPhos  101  11-18    LIVER FUNCTIONS - ( 18 Nov 2020 06:54 )  Alb: 4.3 g/dL / Pro: 7.4 g/dL / ALK PHOS: 101 U/L / ALT: 40 U/L / AST: 29 U/L / GGT: x               RADIOLOGY & ADDITIONAL STUDIES:      IMPRESSION & PLAN:    1. Right [place] Palsy  2. Possible cerebrovascular disease/stenosis (incidental, as seen on CTA)      Awaiting MRI brain with/woithout rani and MRAs  continue prednisone

## 2020-11-18 NOTE — PHYSICAL THERAPY INITIAL EVALUATION ADULT - PERTINENT HX OF CURRENT PROBLEM, REHAB EVAL
65y Male with past medical history significant for history of HLD none smoker who presents to ED C/O of right sided facial droop and dysarthria that started this morning ~ 6:30 AM.  Patient had a difficulties time swallowing also.  The Patient well and denies any chest pain orthopnea or PND.  CT head showed suspicion for acute infrarct in the right subinsular cortex and right ganglio capsular region.

## 2020-11-18 NOTE — PROGRESS NOTE ADULT - ASSESSMENT
#cva - for mri head today   --> asa, plavix and statin   --> npo pmn for gris tomorrow  --> cardio and neurofollowing     #hld - statin   advised diet and exercise    dispo - npo pmn

## 2020-11-18 NOTE — PROGRESS NOTE ADULT - SUBJECTIVE AND OBJECTIVE BOX
[facility]                                   [address] [place] [address]                                                      PHONE: [phone]                                                         FAX: [phone]  [email or web address]  ---------------------------------------------------------------------------------------------------------------------------------    Overnight events/patient complaints:  NAD feeling well today     No Known Allergies    MEDICATIONS  (STANDING):  aspirin enteric coated 81 milliGRAM(s) Oral daily  atorvastatin 80 milliGRAM(s) Oral at bedtime  clopidogrel Tablet 75 milliGRAM(s) Oral daily  dextrose 40% Gel 15 Gram(s) Oral once  dextrose 5%. 1000 milliLiter(s) (50 mL/Hr) IV Continuous <Continuous>  dextrose 5%. 1000 milliLiter(s) (100 mL/Hr) IV Continuous <Continuous>  dextrose 50% Injectable 25 Gram(s) IV Push once  dextrose 50% Injectable 12.5 Gram(s) IV Push once  dextrose 50% Injectable 25 Gram(s) IV Push once  glucagon  Injectable 1 milliGRAM(s) IntraMuscular once  influenza   Vaccine 0.5 milliLiter(s) IntraMuscular once  insulin lispro (ADMELOG) corrective regimen sliding scale   SubCutaneous three times a day before meals  predniSONE   Tablet 60 milliGRAM(s) Oral daily    MEDICATIONS  (PRN):  acetaminophen   Tablet .. 650 milliGRAM(s) Oral every 6 hours PRN Temp greater or equal to 38C (100.4F), Mild Pain (1 - 3)      Vital Signs Last 24 Hrs  T(C): 36.6 (18 Nov 2020 05:14), Max: 37.2 (17 Nov 2020 13:38)  T(F): 97.9 (18 Nov 2020 05:14), Max: 98.9 (17 Nov 2020 13:38)  HR: 65 (18 Nov 2020 05:14) (64 - 80)  BP: 136/69 (18 Nov 2020 05:14) (134/83 - 164/83)  BP(mean): 84 (18 Nov 2020 05:14) (84 - 84)  RR: 18 (18 Nov 2020 05:14) (16 - 20)  SpO2: 98% (18 Nov 2020 05:14) (94% - 98%)  ICU Vital Signs Last 24 Hrs  ASHLEY VERA  I&O's Detail    I&O's Summary    Drug Dosing Weight  ASHLEY VERA      PHYSICAL EXAM:  General: Appears well developed, well nourished alert and cooperative.  HEENT: Head; normocephalic, atraumatic.  Eyes: Pupils reactive, cornea wnl.  Neck: Supple, no nodes adenopathy, no NVD or carotid bruit or thyromegaly.  CARDIOVASCULAR: Normal S1 and S2, No murmur, rub, gallop or lift.   LUNGS: No rales, rhonchi or wheeze. Normal breath sounds bilaterally.  ABDOMEN: Soft, nontender without mass or organomegaly. bowel sounds normoactive.  EXTREMITIES: No clubbing, cyanosis or edema. Distal pulses wnl.   SKIN: warm and dry with normal turgor.  NEURO: Alert/oriented x 3   PSYCH: normal affect.        LABS:                        16.6   5.49  )-----------( 185      ( 18 Nov 2020 06:54 )             49.0     11-18    137  |  102  |  18.0  ----------------------------<  103<H>  4.1   |  26.0  |  0.65    Ca    8.9      18 Nov 2020 06:54    TPro  7.4  /  Alb  4.3  /  TBili  0.9  /  DBili  x   /  AST  29  /  ALT  40  /  AlkPhos  101  11-18    ASHLEY VERA  CARDIAC MARKERS ( 17 Nov 2020 13:20 )  x     / <0.01 ng/mL / x     / x     / x          PT/INR - ( 17 Nov 2020 13:20 )   PT: 12.0 sec;   INR: 1.04 ratio         PTT - ( 17 Nov 2020 13:20 )  PTT:36.6 sec      RADIOLOGY & ADDITIONAL STUDIES:    INTERPRETATION OF TELEMETRY (personally reviewed): NSR     ECG: Pending     ECHO: pending   IMPRESSION:    CT brain: There is suspicion for an acute infarct in the right subinsular cortex and right gangliocapsular region. No intracranial hemorrhage, midline shift or herniation.    Assessment and Plan:  In summary, ASHLEY GAXIOLA is an 65y Male with past medical history significant for history of HLD none smoker who presents to ED C/O of right sided facial droop and dysarthria that started this morning ~ 6:30 AM.  Patient had a difficulties time swallowing also.  The Patient well and denies any chest pain orthopnea or PND.  CT head showed suspicion for acute infrarct in the right subinsular cortex and right ganglio capsular region.     Plan  1.  Awaiting TTE to evaluate LV EF   2.  Awaiting brain MRI and MRA   3.  Neurology follow up   4.  COLIN/ILR tomorrow; please keep NPO

## 2020-11-18 NOTE — OCCUPATIONAL THERAPY INITIAL EVALUATION ADULT - ADDITIONAL COMMENTS
Pt lives in private apartment with no steps to enter, no steps inside.  Pt drives.  He has no medical equipment.

## 2020-11-18 NOTE — PROGRESS NOTE ADULT - SUBJECTIVE AND OBJECTIVE BOX
Interval: Patient seen by neurointerventional team. Patient reports that the symptoms he had yesterday of feeling that his lip was weak has improved.        Vital Signs Last 24 Hrs  T(C): 36.8 (18 Nov 2020 13:30), Max: 36.9 (17 Nov 2020 19:42)  T(F): 98.3 (18 Nov 2020 13:30), Max: 98.5 (17 Nov 2020 19:42)  HR: 94 (18 Nov 2020 13:30) (64 - 94)  BP: 137/77 (18 Nov 2020 13:30) (134/71 - 154/78)  BP(mean): 84 (18 Nov 2020 05:14) (84 - 84)  RR: 18 (18 Nov 2020 13:30) (16 - 20)  SpO2: 95% (18 Nov 2020 13:30) (94% - 98%)    PHYSICAL EXAM:  Detailed Neurologic Exam:    Mental status: The patient is awake and alert and has normal attention span.  The patient is fully oriented in 3 spheres. The patient is oriented to current events. The patient is able to name objects, follow commands, repeat sentences.  Cranial nerves: Pupils equal and react symmetrically to light. There is no visual field deficit to confrontation. Extraocular motion is full with no nystagmus. Unable to close right eye fully. Facial sensation is intact. Right lower facial droop. Palate elevates symmetrically. Shoulder shrug is normal.   Motor: There is normal bulk and tone.  There is no tremor.  Strength is 5/5 in the right arm and leg.   Strength is 5/5 in the left arm and leg.  Sensation: Intact to light touch in 4 extremities.  Cerebellar: There is no dysmetria on finger to nose testing.  Gait : deferred      LABS:                        16.6   5.49  )-----------( 185      ( 18 Nov 2020 06:54 )             49.0     11-18    137  |  102  |  18.0  ----------------------------<  103<H>  4.1   |  26.0  |  0.65    Ca    8.9      18 Nov 2020 06:54    TPro  7.4  /  Alb  4.3  /  TBili  0.9  /  DBili  x   /  AST  29  /  ALT  40  /  AlkPhos  101  11-18    PT/INR - ( 17 Nov 2020 13:20 )   PT: 12.0 sec;   INR: 1.04 ratio         PTT - ( 17 Nov 2020 13:20 )  PTT:36.6 sec      11-18 @ 07:01  -  11-18 @ 16:49  --------------------------------------------------------  IN: 480 mL / OUT: 0 mL / NET: 480 mL        RADIOLOGY & ADDITIONAL TESTS:    < from: MR Head w/wo IV Cont (11.18.20 @ 12:36) >  IMPRESSION:    There is no acute infarction, intracranial hemorrhage, mass lesion, mass effect or herniation. There is no abnormal intracranial enhancement.    There is high-grade narrowing/occlusion of the right MCA as described. The narrowing/occlusion appears to be chronic.    There is no hemodynamically significant narrowing in the neck or dissection.    < end of copied text >      `< from: TTE Echo Complete w/o Contrast w/ Doppler (11.18.20 @ 10:35) >    Summary:   1. Left ventricular ejection fraction, by visual estimation, is 65 to 70%.   2. Normal global left ventricularsystolic function.   3. Spectral Doppler shows impaired relaxation pattern of left ventricular myocardial filling (Grade I diastolic dysfunction).   4. There is no evidence of pericardial effusion.   5. Sclerotic aortic valve with normal opening.    < end of copied text >

## 2020-11-18 NOTE — PHYSICAL THERAPY INITIAL EVALUATION ADULT - ADDITIONAL COMMENTS
Medicine PA Critical Note    Notified by RN for blood cultures from 09/21/2020 preliminary results growth in aerobic bottle: gram positive cocci in clusters, growth in anaerobic bottle: gram positive cocci in clusters and  gram positive cocci in pairs and Chains. PCR and sensitivities still pending  Patient is currently on IV ertapenem, and is being followed by ID.   VSS.  Discussed with RN  ID to f/u in AM  Will endorse to AM team    Tova Rowell PA-C  Department of Medicine  [facility] [id number] Pt reports living in a private apartment with spouse who is able to assist but not physically as she has her own medical issues. Pt independent prior to admission, owns no DME. There is 1 step to enter and 1 step inside no handrails just wall for support. Pt works, does not drive.

## 2020-11-18 NOTE — PHYSICAL THERAPY INITIAL EVALUATION ADULT - GENERAL OBSERVATIONS, REHAB EVAL
Pt received in bed, + IV Loc, +Tele, + wife & step daughter Yojana present (Yojana a  here at [facility] willing & able to translate), in 0/10 pain, breathing on RA in NAD, agreeable to PT/OT evaluation

## 2020-11-18 NOTE — PHYSICAL THERAPY INITIAL EVALUATION ADULT - PLANNED THERAPY INTERVENTIONS, PT EVAL
Pt is independent with functional mobility, no inpatient skilled PT services required, PT will no longer continue to follow.

## 2020-11-18 NOTE — PROGRESS NOTE ADULT - SUBJECTIVE AND OBJECTIVE BOX
ASHLEY GAXIOLA    [id number]    65y      Male    INTERVAL HPI/OVERNIGHT EVENTS: patient seen at bedside with ipad . patient denies any complaints    REVIEW OF SYSTEMS:    CONSTITUTIONAL: No fever, weight loss, or fatigue  RESPIRATORY: No cough, wheezing, hemoptysis; No shortness of breath  CARDIOVASCULAR: No chest pain, palpitations  GASTROINTESTINAL: No abdominal or epigastric pain. No nausea, vomiting  NEUROLOGICAL: No headaches, memory loss, loss of strength.  MISCELLANEOUS:      Vital Signs Last 24 Hrs  T(C): 36.6 (18 Nov 2020 05:14), Max: 37.2 (17 Nov 2020 13:38)  T(F): 97.9 (18 Nov 2020 05:14), Max: 98.9 (17 Nov 2020 13:38)  HR: 74 (18 Nov 2020 08:50) (64 - 80)  BP: 134/71 (18 Nov 2020 08:50) (134/71 - 164/83)  BP(mean): 84 (18 Nov 2020 05:14) (84 - 84)  RR: 18 (18 Nov 2020 08:50) (16 - 20)  SpO2: 96% (18 Nov 2020 08:50) (94% - 98%)    PHYSICAL EXAM:    GENERAL: NAD, well-groomed, well-developed  HEAD:  Atraumatic, Normocephalic  EYES: EOMI, PERRLA, conjunctiva and sclera clear  ENMT: subtle right facial droop  NECK: Supple, No JVD, Normal thyroid  NERVOUS SYSTEM:  Alert & Oriented X3, Good concentration; Motor Strength 5/5 B/L upper and lower extremities; sensory intact   CHEST/LUNG: Clear to percussion bilaterally; No rales, rhonchi, wheezing, or rubs  HEART: Regular rate and rhythm; No murmurs, rubs, or gallops  ABDOMEN: Soft, Nontender, Nondistended; Bowel sounds present  EXTREMITIES:  2+ Peripheral Pulses, No clubbing, cyanosis, or edema  LYMPH: No lymphadenopathy noted  SKIN: No rashes or lesions      LABS:                        16.6   5.49  )-----------( 185      ( 18 Nov 2020 06:54 )             49.0     11-18    137  |  102  |  18.0  ----------------------------<  103<H>  4.1   |  26.0  |  0.65    Ca    8.9      18 Nov 2020 06:54    TPro  7.4  /  Alb  4.3  /  TBili  0.9  /  DBili  x   /  AST  29  /  ALT  40  /  AlkPhos  101  11-18    PT/INR - ( 17 Nov 2020 13:20 )   PT: 12.0 sec;   INR: 1.04 ratio         PTT - ( 17 Nov 2020 13:20 )  PTT:36.6 sec        MEDICATIONS  (STANDING):  aspirin enteric coated 81 milliGRAM(s) Oral daily  atorvastatin 80 milliGRAM(s) Oral at bedtime  clopidogrel Tablet 75 milliGRAM(s) Oral daily  dextrose 40% Gel 15 Gram(s) Oral once  dextrose 5%. 1000 milliLiter(s) (50 mL/Hr) IV Continuous <Continuous>  dextrose 5%. 1000 milliLiter(s) (100 mL/Hr) IV Continuous <Continuous>  dextrose 50% Injectable 25 Gram(s) IV Push once  dextrose 50% Injectable 12.5 Gram(s) IV Push once  dextrose 50% Injectable 25 Gram(s) IV Push once  glucagon  Injectable 1 milliGRAM(s) IntraMuscular once  influenza   Vaccine 0.5 milliLiter(s) IntraMuscular once  insulin lispro (ADMELOG) corrective regimen sliding scale   SubCutaneous three times a day before meals  predniSONE   Tablet 60 milliGRAM(s) Oral daily    MEDICATIONS  (PRN):  acetaminophen   Tablet .. 650 milliGRAM(s) Oral every 6 hours PRN Temp greater or equal to 38C (100.4F), Mild Pain (1 - 3)      RADIOLOGY & ADDITIONAL TESTS:

## 2020-11-18 NOTE — SPEECH LANGUAGE PATHOLOGY EVALUATION - SLP PERTINENT HISTORY OF CURRENT PROBLEM
Per Neurology Note: 1. Right [place] Palsy Per Neurology Note: 1. Right [place] Palsy 2. Possible cerebrovascular disease/stenosis (incidental, as seen on CTA) 3. MRI pending

## 2020-11-18 NOTE — SPEECH LANGUAGE PATHOLOGY EVALUATION - COMMENTS
MRI done today: There is no acute infarction, intracranial hemorrhage, mass lesion, mass effect or herniation. There is no abnormal intracranial enhancement. There is high-grade narrowing/occlusion of the right MCA as described. The narrowing/occlusion appears to be chronic.  There is no hemodynamically significant narrowing in the neck or dissection.

## 2020-11-18 NOTE — OCCUPATIONAL THERAPY INITIAL EVALUATION ADULT - LEVEL OF INDEPENDENCE: EATING, OT EVAL
independent independent/once cleared by speech - pt reports no difficulty managing food setup and feeding self, drinking from a cup

## 2020-11-18 NOTE — PROGRESS NOTE ADULT - ASSESSMENT
Assessment: This is  a 65y Male with PMH of HLD, presented to ED w cc  of right sided facial droop and a feeling of lip tingling that started this morning ~ 6:30 AM while he was shaving. ED reported he had difficulties swallowing and dysarthria but both patient and wife deny this to me. CTH showed suspicion for acute infarct in the right subinsular cortex and right ganglio capsular region. Neuro IR team consulted for reading of possible R M1 occlusion on CTA.  CT perfusion scan wnl. MRI wnl, MRA shows narrowing right MCA. Patient with facial droop/unable to close right eye fully, symptoms consistent with Bell's Palsy.            Plan:   --> Reviewed imaging with Dr Brown, patient has severe diffuse intracranial atherosclerotic disease, chronic narrowing right MCA, MRI negative for acute infarct  --> Rec maximum medical therapy for intracranial stenosis which is  and Plavix X 3 months then repeat imaging as per [facility] Study  --> Patient admitted to hospitalist service   --> Cardiology and general neurology following  --> Stroke Core Measures:  -->  HgA1C= 5.7   -->  - Cont Statin  --> TTE with sclerotic aortic valve, Grade I DD, preserved EF  --> Neurology following- treating for right Bell's Palsy   --> Cardiology following- planning for COLIN/ILR  --> No acute intervention indicated from neuro IR, recommendations as above  --> Patient can follow up with Dr Brown in the office in 3 months  --> Please recall as needed

## 2020-11-19 ENCOUNTER — TRANSCRIPTION ENCOUNTER (OUTPATIENT)
Age: 65
End: 2020-11-19

## 2020-11-19 VITALS
SYSTOLIC BLOOD PRESSURE: 145 MMHG | DIASTOLIC BLOOD PRESSURE: 72 MMHG | RESPIRATION RATE: 18 BRPM | TEMPERATURE: 98 F | OXYGEN SATURATION: 98 % | HEART RATE: 71 BPM

## 2020-11-19 DIAGNOSIS — E66.9 OBESITY, UNSPECIFIED: ICD-10-CM

## 2020-11-19 LAB
ACE SERPL-CCNC: 54 U/L — SIGNIFICANT CHANGE UP (ref 14–82)
GLUCOSE BLDC GLUCOMTR-MCNC: 121 MG/DL — HIGH (ref 70–99)
SARS-COV-2 IGG SERPL QL IA: POSITIVE
SARS-COV-2 IGM SERPL IA-ACNC: 3.23 INDEX — HIGH

## 2020-11-19 PROCEDURE — 80061 LIPID PANEL: CPT

## 2020-11-19 PROCEDURE — 86803 HEPATITIS C AB TEST: CPT

## 2020-11-19 PROCEDURE — 99291 CRITICAL CARE FIRST HOUR: CPT | Mod: 25

## 2020-11-19 PROCEDURE — 99239 HOSP IP/OBS DSCHRG MGMT >30: CPT

## 2020-11-19 PROCEDURE — 97167 OT EVAL HIGH COMPLEX 60 MIN: CPT

## 2020-11-19 PROCEDURE — 83036 HEMOGLOBIN GLYCOSYLATED A1C: CPT

## 2020-11-19 PROCEDURE — 70450 CT HEAD/BRAIN W/O DYE: CPT

## 2020-11-19 PROCEDURE — 86769 SARS-COV-2 COVID-19 ANTIBODY: CPT

## 2020-11-19 PROCEDURE — 70498 CT ANGIOGRAPHY NECK: CPT

## 2020-11-19 PROCEDURE — 86900 BLOOD TYPING SEROLOGIC ABO: CPT

## 2020-11-19 PROCEDURE — 36415 COLL VENOUS BLD VENIPUNCTURE: CPT

## 2020-11-19 PROCEDURE — 93005 ELECTROCARDIOGRAM TRACING: CPT

## 2020-11-19 PROCEDURE — T1013: CPT

## 2020-11-19 PROCEDURE — 87476 LYME DIS DNA AMP PROBE: CPT

## 2020-11-19 PROCEDURE — 0042T: CPT

## 2020-11-19 PROCEDURE — 92523 SPEECH SOUND LANG COMPREHEN: CPT

## 2020-11-19 PROCEDURE — 82164 ANGIOTENSIN I ENZYME TEST: CPT

## 2020-11-19 PROCEDURE — 85025 COMPLETE CBC W/AUTO DIFF WBC: CPT

## 2020-11-19 PROCEDURE — 70549 MR ANGIOGRAPH NECK W/O&W/DYE: CPT

## 2020-11-19 PROCEDURE — 80053 COMPREHEN METABOLIC PANEL: CPT

## 2020-11-19 PROCEDURE — 86850 RBC ANTIBODY SCREEN: CPT

## 2020-11-19 PROCEDURE — 84443 ASSAY THYROID STIM HORMONE: CPT

## 2020-11-19 PROCEDURE — 84484 ASSAY OF TROPONIN QUANT: CPT

## 2020-11-19 PROCEDURE — 82962 GLUCOSE BLOOD TEST: CPT

## 2020-11-19 PROCEDURE — 97163 PT EVAL HIGH COMPLEX 45 MIN: CPT

## 2020-11-19 PROCEDURE — 85730 THROMBOPLASTIN TIME PARTIAL: CPT

## 2020-11-19 PROCEDURE — U0003: CPT

## 2020-11-19 PROCEDURE — 86618 LYME DISEASE ANTIBODY: CPT

## 2020-11-19 PROCEDURE — 85610 PROTHROMBIN TIME: CPT

## 2020-11-19 PROCEDURE — 70496 CT ANGIOGRAPHY HEAD: CPT

## 2020-11-19 PROCEDURE — 70553 MRI BRAIN STEM W/O & W/DYE: CPT

## 2020-11-19 PROCEDURE — 70544 MR ANGIOGRAPHY HEAD W/O DYE: CPT

## 2020-11-19 PROCEDURE — 93306 TTE W/DOPPLER COMPLETE: CPT

## 2020-11-19 PROCEDURE — 86901 BLOOD TYPING SEROLOGIC RH(D): CPT

## 2020-11-19 RX ORDER — ASPIRIN/CALCIUM CARB/MAGNESIUM 324 MG
1 TABLET ORAL
Qty: 30 | Refills: 0
Start: 2020-11-19 | End: 2020-12-18

## 2020-11-19 RX ORDER — ATORVASTATIN CALCIUM 80 MG/1
1 TABLET, FILM COATED ORAL
Qty: 0 | Refills: 0 | DISCHARGE

## 2020-11-19 RX ORDER — ASPIRIN/CALCIUM CARB/MAGNESIUM 324 MG
325 TABLET ORAL DAILY
Refills: 0 | Status: DISCONTINUED | OUTPATIENT
Start: 2020-11-19 | End: 2020-11-19

## 2020-11-19 RX ORDER — ATORVASTATIN CALCIUM 80 MG/1
1 TABLET, FILM COATED ORAL
Qty: 30 | Refills: 0
Start: 2020-11-19 | End: 2020-12-18

## 2020-11-19 RX ORDER — CLOPIDOGREL BISULFATE 75 MG/1
1 TABLET, FILM COATED ORAL
Qty: 30 | Refills: 0
Start: 2020-11-19 | End: 2020-12-18

## 2020-11-19 RX ADMIN — Medication 60 MILLIGRAM(S): at 05:52

## 2020-11-19 NOTE — DISCHARGE NOTE PROVIDER - NSDCMRMEDTOKEN_GEN_ALL_CORE_FT
aspirin 325 mg oral tablet: 1 tab(s) orally once a day  atorvastatin 80 mg oral tablet: 1 tab(s) orally once a day (at bedtime)  clopidogrel 75 mg oral tablet: 1 tab(s) orally once a day

## 2020-11-19 NOTE — DISCHARGE NOTE NURSING/CASE MANAGEMENT/SOCIAL WORK - NSDCPEPTSTRK_GEN_ALL_CORE
Need for follow up after discharge/Risk factors for stroke/Stroke warning signs and symptoms/Signs and symptoms of stroke/Stroke education booklet/Stroke support groups for patients, families, and friends/Call 911 for stroke/Prescribed medications

## 2020-11-19 NOTE — PROGRESS NOTE ADULT - SUBJECTIVE AND OBJECTIVE BOX
INTERVAL HISTORY:        VITAL SIGNS:  Vital Signs Last 24 Hrs  T(C): 36.7 (19 Nov 2020 09:43), Max: 36.8 (18 Nov 2020 13:30)  T(F): 98.1 (19 Nov 2020 09:43), Max: 98.3 (18 Nov 2020 13:30)  HR: 71 (19 Nov 2020 09:43) (71 - 111)  BP: 145/72 (19 Nov 2020 09:43) (106/66 - 152/77)  BP(mean): --  RR: 18 (19 Nov 2020 09:43) (18 - 19)  SpO2: 98% (19 Nov 2020 09:43) (95% - 100%)    PHYSICAL EXAMINATION:    Mentation:    Language/Speech:  CN:  Visual Fields:  Motor:  Sensory:  DTR:  Babinski:      MEDS:  MEDICATIONS  (STANDING):  aspirin 325 milliGRAM(s) Oral daily  atorvastatin 80 milliGRAM(s) Oral at bedtime  clopidogrel Tablet 75 milliGRAM(s) Oral daily  dextrose 40% Gel 15 Gram(s) Oral once  dextrose 5%. 1000 milliLiter(s) (50 mL/Hr) IV Continuous <Continuous>  dextrose 5%. 1000 milliLiter(s) (100 mL/Hr) IV Continuous <Continuous>  dextrose 50% Injectable 25 Gram(s) IV Push once  dextrose 50% Injectable 12.5 Gram(s) IV Push once  dextrose 50% Injectable 25 Gram(s) IV Push once  glucagon  Injectable 1 milliGRAM(s) IntraMuscular once  insulin lispro (ADMELOG) corrective regimen sliding scale   SubCutaneous three times a day before meals  predniSONE   Tablet 60 milliGRAM(s) Oral daily    MEDICATIONS  (PRN):  acetaminophen   Tablet .. 650 milliGRAM(s) Oral every 6 hours PRN Temp greater or equal to 38C (100.4F), Mild Pain (1 - 3)      LABS:                          16.6   5.49  )-----------( 185      ( 18 Nov 2020 06:54 )             49.0     11-18    137  |  102  |  18.0  ----------------------------<  103<H>  4.1   |  26.0  |  0.65    Ca    8.9      18 Nov 2020 06:54    TPro  7.4  /  Alb  4.3  /  TBili  0.9  /  DBili  x   /  AST  29  /  ALT  40  /  AlkPhos  101  11-18    LIVER FUNCTIONS - ( 18 Nov 2020 06:54 )  Alb: 4.3 g/dL / Pro: 7.4 g/dL / ALK PHOS: 101 U/L / ALT: 40 U/L / AST: 29 U/L / GGT: x               RADIOLOGY & ADDITIONAL STUDIES:      IMPRESSION & PLAN:           INTERVAL HISTORY:  stable - for discharge this am      VITAL SIGNS:  Vital Signs Last 24 Hrs  T(C): 36.7 (19 Nov 2020 09:43), Max: 36.8 (18 Nov 2020 13:30)  T(F): 98.1 (19 Nov 2020 09:43), Max: 98.3 (18 Nov 2020 13:30)  HR: 71 (19 Nov 2020 09:43) (71 - 111)  BP: 145/72 (19 Nov 2020 09:43) (106/66 - 152/77)  BP(mean): --  RR: 18 (19 Nov 2020 09:43) (18 - 19)  SpO2: 98% (19 Nov 2020 09:43) (95% - 100%)    PHYSICAL EXAMINATION:    Mentation:    Language/Speech:  CN:  Visual Fields:  Motor:  Sensory:  DTR:  Babinski:      MEDS:  MEDICATIONS  (STANDING):  aspirin 325 milliGRAM(s) Oral daily  atorvastatin 80 milliGRAM(s) Oral at bedtime  clopidogrel Tablet 75 milliGRAM(s) Oral daily  dextrose 40% Gel 15 Gram(s) Oral once  dextrose 5%. 1000 milliLiter(s) (50 mL/Hr) IV Continuous <Continuous>  dextrose 5%. 1000 milliLiter(s) (100 mL/Hr) IV Continuous <Continuous>  dextrose 50% Injectable 25 Gram(s) IV Push once  dextrose 50% Injectable 12.5 Gram(s) IV Push once  dextrose 50% Injectable 25 Gram(s) IV Push once  glucagon  Injectable 1 milliGRAM(s) IntraMuscular once  insulin lispro (ADMELOG) corrective regimen sliding scale   SubCutaneous three times a day before meals  predniSONE   Tablet 60 milliGRAM(s) Oral daily    MEDICATIONS  (PRN):  acetaminophen   Tablet .. 650 milliGRAM(s) Oral every 6 hours PRN Temp greater or equal to 38C (100.4F), Mild Pain (1 - 3)      LABS:                          16.6   5.49  )-----------( 185      ( 18 Nov 2020 06:54 )             49.0     11-18    137  |  102  |  18.0  ----------------------------<  103<H>  4.1   |  26.0  |  0.65    Ca    8.9      18 Nov 2020 06:54    TPro  7.4  /  Alb  4.3  /  TBili  0.9  /  DBili  x   /  AST  29  /  ALT  40  /  AlkPhos  101  11-18    LIVER FUNCTIONS - ( 18 Nov 2020 06:54 )  Alb: 4.3 g/dL / Pro: 7.4 g/dL / ALK PHOS: 101 U/L / ALT: 40 U/L / AST: 29 U/L / GGT: x               RADIOLOGY & ADDITIONAL STUDIES:      < from: MR Head w/wo IV Cont (11.18.20 @ 12:36) >  INTERPRETATION:  MRI OF THE BRAIN WITH AND WITHOUT CONTRAST and MRA of the neck with and without contrast. MRA brain with and without contrast.    CLINICAL INDICATION: Right-sided facial droop, rule out CVA; right MCA occlusion seen on prior CT angiogram.    TECHNIQUE: Multiplanar multisequence MRI of the brain was performed with and without contrast. Neck MRA was performed with and without contrast. 3-D time-of-flight technique was used. 3-D reconstructions were performed. Carotid stenosis was calculated based on NASCET criteria. Noncontrast MRA of the head was performed using time-of-flight technique with 3-D reconstructions.    CONTRAST: 7.5 mL of intravenous contrast Gadavist was administered without complications.    COMPARISON: CT stroke series, 11/17/2020    FINDINGS:  MRI brain:  There are a few T2/FLAIR hyperintensities in the subcortical and periventricular white matter which are nonspecific but most commonly represent chronic microvascular ischemic changes.    The ventricles, cisterns and sulci are normal in size and configuration. There is no hydrocephalus. There is no acute infarction, intracranial hemorrhage, mass lesion, mass effect or herniation. The previously seen hypodensities in the right subinsular cortex and right thalamocapsular region corresponds to enlarged perivascular spaces.    There is no abnormal enhancement intracranially.    The flow void of the right MCA is truncated, consistent with the previously seen occlusion. With the exception of this finding, the rest of the central arterial circulation at the skull base are normal, suggestive of of their patency.    The visualized globes are symmetric bilaterally. The paranasal sinuses and tympanomastoid air cells are clear.    MRA brain:  The petrocavernous and supraclinoid ICA segments are normal in caliber. There is high-grade narrowing/occlusion of the entire right MCA just distal to the carotid terminus. The visualized left MCA and IVANNA branches do not demonstrate high-grade stenosis or aneurysm. There is mild narrowing of the left MCA. There is mild to moderate narrowing of the proximal left M2 branch at the bifurcation. There is mild narrowing of the left pericallosal A2 segment. The anterior communicating artery is present. Bilateral posterior communicating arteries are present. The vertebral arteries,cerebellar arteries, basilar and bilateral posterior cerebral arteries are patent without evidence of stenoses or aneurysm.    MRA neck:    There is no significant stenosis of the origins of the great vessels from the aortic arch.    Right carotid: The right common carotid artery shows no significant stenosis. There is no significant narrowing involving the right carotid bifurcation and proximal right internal carotid artery. The remainder of the right internal carotid artery to the skull base shows no significant narrowing. Distal right ICA lumen diameter is 4.4 mm.    Left carotid: The left common carotid artery is intact. There is no significant narrowing involving the left carotid bifurcation and proximal left internal carotid artery. Theremainder of the left internal carotid artery to the skull base shows no significant narrowing. Distal left ICA lumen diameter is 4.6 mm.    Vertebral arteries: There is flow seen in both cervical vertebral arteries without evidence of high-grade stenosis. There is no reversal of flow to suggest subclavian steal.    IMPRESSION:    There is no acute infarction, intracranial hemorrhage, mass lesion, mass effect or herniation. There is no abnormal intracranial enhancement.    There is high-grade narrowing/occlusion of the right MCA as described. The narrowing/occlusion appears to be chronic.    There is no hemodynamically significant narrowing in the neck or dissection.        < end of copied text >    IMPRESSION & PLAN:    1. Heaters Palsy  -rapid taper of steroids at discharge is recommended  2. Chronic rt MCA occlusion with collateral flow  no acute stroke--  Cerebrovascular risk factor assessment and management. Antiplatelet therapy as prescribed.  Cleared for discharge

## 2020-11-19 NOTE — DISCHARGE NOTE PROVIDER - CARE PROVIDER_API CALL
Monie Moran  [facility] MEDICINE  [address], [address]  [place] [address]  Phone: [phone]  Fax: [phone]  Follow Up Time:     Gail Tong)  Neurology; Vascular Neurology  [address] [place] [address]  Phone: [phone]  Fax: [phone]  Follow Up Time:     Nikko Morrison  CARDIOLOGY  [address] [place] [address]  Phone: [phone]  Fax: [phone]  Follow Up Time:

## 2020-11-19 NOTE — DISCHARGE NOTE PROVIDER - PROVIDER TOKENS
PROVIDER:[TOKEN:[[id number]]],PROVIDER:[TOKEN:[[id number]]],PROVIDER:[TOKEN:[[id number]]]

## 2020-11-19 NOTE — DISCHARGE NOTE PROVIDER - CARE PROVIDERS DIRECT ADDRESSES
,DirectAddress_Unknown,[email or web address]

## 2020-11-19 NOTE — DISCHARGE NOTE NURSING/CASE MANAGEMENT/SOCIAL WORK - PATIENT PORTAL LINK FT
You can access the FollowMyHealth Patient Portal offered by [facility] by registering at the following website: [email or web address]. By joining [employer]’s FollowMyHealth portal, you will also be able to view your health information using other applications (apps) compatible with our system.

## 2020-11-19 NOTE — DISCHARGE NOTE PROVIDER - HOSPITAL COURSE
65 yom with pmh of hld on statin presents from home with right facial droop and vague feelings of inability to move his upper lip area. Patient states the symptoms started early this am associated with numbness and tingling. Patient denies any other complaints and wife was at bedside who corroborated the story. Wife denies any confusion, dysarthria and weakness of her . Patient worked up in er and found to have a nih of 3 as per records and a likley stroke on the admission ct head.    patient admitted to medicine and seen by cardio, neuro and neurointerventional in consult. patient started on asa and plavix and statin.     time spent on dc 34 minutes    pe    GENERAL: NAD, well-groomed, well-developed  HEAD:  Atraumatic, Normocephalic  EYES: EOMI, PERRLA, conjunctiva and sclera clear  ENMT: subtle right facial droop  NECK: Supple, No JVD, Normal thyroid  NERVOUS SYSTEM:  Alert & Oriented X3, Good concentration; Motor Strength 5/5 B/L upper and lower extremities; sensory intact   CHEST/LUNG: Clear to percussion bilaterally; No rales, rhonchi, wheezing, or rubs  HEART: Regular rate and rhythm; No murmurs, rubs, or gallops  ABDOMEN: Soft, Nontender, Nondistended; Bowel sounds present  EXTREMITIES:  2+ Peripheral Pulses, No clubbing, cyanosis, or edema  LYMPH: No lymphadenopathy noted  SKIN: No rashes or lesions

## 2020-11-19 NOTE — PROGRESS NOTE ADULT - SUBJECTIVE AND OBJECTIVE BOX
[place] [facility] - [facility], THE HE[facility]                                   [address] [place] [address]                                                      PHONE: [phone]                                                         FAX: [phone]  [email or web address]  ---------------------------------------------------------------------------------------------------------------------------------    Overnight events/patient complaints:  NAD feeling well today     No Known Allergies    MEDICATIONS  (STANDING):  aspirin 325 milliGRAM(s) Oral daily  atorvastatin 80 milliGRAM(s) Oral at bedtime  clopidogrel Tablet 75 milliGRAM(s) Oral daily  dextrose 40% Gel 15 Gram(s) Oral once  dextrose 5%. 1000 milliLiter(s) (50 mL/Hr) IV Continuous <Continuous>  dextrose 5%. 1000 milliLiter(s) (100 mL/Hr) IV Continuous <Continuous>  dextrose 50% Injectable 25 Gram(s) IV Push once  dextrose 50% Injectable 12.5 Gram(s) IV Push once  dextrose 50% Injectable 25 Gram(s) IV Push once  glucagon  Injectable 1 milliGRAM(s) IntraMuscular once  insulin lispro (ADMELOG) corrective regimen sliding scale   SubCutaneous three times a day before meals  predniSONE   Tablet 60 milliGRAM(s) Oral daily    MEDICATIONS  (PRN):  acetaminophen   Tablet .. 650 milliGRAM(s) Oral every 6 hours PRN Temp greater or equal to 38C (100.4F), Mild Pain (1 - 3)      Vital Signs Last 24 Hrs  T(C): 36.7 (19 Nov 2020 09:43), Max: 36.8 (18 Nov 2020 13:30)  T(F): 98.1 (19 Nov 2020 09:43), Max: 98.3 (18 Nov 2020 13:30)  HR: 71 (19 Nov 2020 09:43) (71 - 111)  BP: 145/72 (19 Nov 2020 09:43) (106/66 - 152/77)  BP(mean): --  RR: 18 (19 Nov 2020 09:43) (18 - 19)  SpO2: 98% (19 Nov 2020 09:43) (95% - 100%)  ICU Vital Signs Last 24 Hrs  ASHLEY VERA  I&O's Detail    18 Nov 2020 07:01  -  19 Nov 2020 07:00  --------------------------------------------------------  IN:    Oral Fluid: 720 mL  Total IN: 720 mL    OUT:  Total OUT: 0 mL    Total NET: 720 mL        I&O's Summary    18 Nov 2020 07:01  -  19 Nov 2020 07:00  --------------------------------------------------------  IN: 720 mL / OUT: 0 mL / NET: 720 mL      Drug Dosing Weight  ASHLEY VERA      PHYSICAL EXAM:  General: Appears well developed, well nourished alert and cooperative.  HEENT: Head; normocephalic, atraumatic.  Eyes: Pupils reactive, cornea wnl.  Neck: Supple, no nodes adenopathy, no NVD or carotid bruit or thyromegaly.  CARDIOVASCULAR: Normal S1 and S2, No murmur, rub, gallop or lift.   LUNGS: No rales, rhonchi or wheeze. Normal breath sounds bilaterally.  ABDOMEN: Soft, nontender without mass or organomegaly. bowel sounds normoactive.  EXTREMITIES: No clubbing, cyanosis or edema. Distal pulses wnl.   SKIN: warm and dry with normal turgor.  NEURO: Alert/oriented x 3/normal motor exam. No pathologic reflexes.    PSYCH: normal affect.        LABS:                        16.6   5.49  )-----------( 185      ( 18 Nov 2020 06:54 )             49.0     11-18    137  |  102  |  18.0  ----------------------------<  103<H>  4.1   |  26.0  |  0.65    Ca    8.9      18 Nov 2020 06:54    TPro  7.4  /  Alb  4.3  /  TBili  0.9  /  DBili  x   /  AST  29  /  ALT  40  /  AlkPhos  101  11-18    ASHLEY VERA  CARDIAC MARKERS ( 17 Nov 2020 13:20 )  x     / <0.01 ng/mL / x     / x     / x          PT/INR - ( 17 Nov 2020 13:20 )   PT: 12.0 sec;   INR: 1.04 ratio         PTT - ( 17 Nov 2020 13:20 )  PTT:36.6 sec      RADIOLOGY & ADDITIONAL STUDIES:    INTERPRETATION OF TELEMETRY (personally reviewed):    ECHO  Summary:   1. Left ventricular ejection fraction, by visual estimation, is 65 to 70%.   2. Normal global left ventricularsystolic function.   3. Spectral Doppler shows impaired relaxation pattern of left ventricular myocardial filling (Grade I diastolic dysfunction).   4. There is no evidence of pericardial effusion.   5. Sclerotic aortic valve with normal opening.    MD Peyton Electronically signed on 11/18/2020 at 5:02:33 PM          IMPRESSION:    CT brain: There is suspicion for an acute infarct in the right subinsular cortex and right gangliocapsular region. No intracranial hemorrhage, midline shift or herniation.    Assessment and Plan:  In summary, ASHLEY GAXIOLA is an 65y Male with past medical history significant for history of HLD none smoker who presents to ED C/O of right sided facial droop and dysarthria that started this morning ~ 6:30 AM.  Patient had a difficulties time swallowing also.  The Patient well and denies any chest pain orthopnea or PND.  CT head showed suspicion for acute infrarct in the right subinsular cortex and right ganglio capsular region; EKG NSR and TTE stable see above; MRI negative for stroke thus COLIN/ILR not needed at this time; TELE stable        Continue current CV medications     out patient follow up

## 2020-11-24 LAB — B BURGDOR DNA SPEC QL NAA+PROBE: NEGATIVE — SIGNIFICANT CHANGE UP

## 2021-07-18 NOTE — OCCUPATIONAL THERAPY INITIAL EVALUATION ADULT - SHORT TERM MEMORY, REHAB EVAL
patient c/o sharp LLQ pain since Friday, pain is on and off with nausea. denies Vomiting and diarrhea. intact show

## 2022-12-14 NOTE — ED STATDOCS - ALLERGIC/IMMUNOLOGIC [-], MLM
immunizations not UTD Retention Suture Text: Retention sutures were placed to support the closure and prevent dehiscence.

## 2023-12-11 NOTE — ED PROVIDER NOTE - PMH
Clinically stable . F/up with endocrinology.  
Clinically stable. Continue Omeprazole.  
Clinically stable. F/up with [facility], patient discontinued medications on her own.   
Clinically stable. F/up with oncology (Dr. Rodriguez)  
Clinically stable. S/p surgery in July 2023. F/up with neurosurgeon.  
Clinically stable. Will monitor.   
Continue current medications. F/up with endocrinology.  
Controlled. Continue current mdications.   
Please decrease Propranolol to once daily as discussed.   Start exercise program ( consider [place])  
Please maintain enough calcium in your diet:  2205-3366 mg daily (consume foods rich in calcium rather than taking only calcium supplement to meet daily calcium requirements), take daily Vitamin D supplement with meal. Average Vit D dose is 2000 international units daily.  Weight bearing exercise routine is recommended.   
No pertinent past medical history

## 2024-01-20 NOTE — SPEECH LANGUAGE PATHOLOGY EVALUATION - SLP DIAGNOSIS
Receptive and expressive language skills WFL. Right facial droop, however, lingual ROM/STRENGTH appear WFL, therefore trace s/s dysarthria noted due to right facial droop.
Kaylen Brown(Resident)

## 2024-02-17 ENCOUNTER — EMERGENCY (EMERGENCY)
Facility: HOSPITAL | Age: 69
LOS: 1 days | Discharge: HOME | End: 2024-02-17
Attending: EMERGENCY MEDICINE
Payer: MEDICARE

## 2024-02-17 VITALS
WEIGHT: 153.44 LBS | SYSTOLIC BLOOD PRESSURE: 174 MMHG | HEIGHT: 60 IN | HEART RATE: 65 BPM | DIASTOLIC BLOOD PRESSURE: 84 MMHG | RESPIRATION RATE: 18 BRPM | TEMPERATURE: 98 F | OXYGEN SATURATION: 97 %

## 2024-02-17 DIAGNOSIS — Z98.890 OTHER SPECIFIED POSTPROCEDURAL STATES: Chronic | ICD-10-CM

## 2024-02-17 PROBLEM — E78.5 HYPERLIPIDEMIA, UNSPECIFIED: Chronic | Status: ACTIVE | Noted: 2020-11-17

## 2024-02-17 PROCEDURE — T1013: CPT

## 2024-02-17 PROCEDURE — 99283 EMERGENCY DEPT VISIT LOW MDM: CPT

## 2024-02-17 PROCEDURE — 73030 X-RAY EXAM OF SHOULDER: CPT | Mod: 26,RT

## 2024-02-17 PROCEDURE — 96372 THER/PROPH/DIAG INJ SC/IM: CPT

## 2024-02-17 PROCEDURE — 73030 X-RAY EXAM OF SHOULDER: CPT

## 2024-02-17 PROCEDURE — 99284 EMERGENCY DEPT VISIT MOD MDM: CPT

## 2024-02-17 RX ORDER — ACETAMINOPHEN 500 MG
2 TABLET ORAL
Qty: 20 | Refills: 0
Start: 2024-02-17

## 2024-02-17 RX ORDER — LIDOCAINE 4 G/100G
1 CREAM TOPICAL ONCE
Refills: 0 | Status: COMPLETED | OUTPATIENT
Start: 2024-02-17 | End: 2024-02-17

## 2024-02-17 RX ORDER — KETOROLAC TROMETHAMINE 30 MG/ML
30 SYRINGE (ML) INJECTION ONCE
Refills: 0 | Status: DISCONTINUED | OUTPATIENT
Start: 2024-02-17 | End: 2024-02-17

## 2024-02-17 RX ORDER — ACETAMINOPHEN 500 MG
975 TABLET ORAL ONCE
Refills: 0 | Status: COMPLETED | OUTPATIENT
Start: 2024-02-17 | End: 2024-02-17

## 2024-02-17 RX ORDER — LIDOCAINE 4 G/100G
1 CREAM TOPICAL
Qty: 10 | Refills: 0
Start: 2024-02-17

## 2024-02-17 RX ADMIN — Medication 975 MILLIGRAM(S): at 09:18

## 2024-02-17 RX ADMIN — LIDOCAINE 1 PATCH: 4 CREAM TOPICAL at 09:18

## 2024-02-17 RX ADMIN — Medication 30 MILLIGRAM(S): at 09:18

## 2024-02-17 NOTE — ED PROVIDER NOTE - PHYSICAL EXAMINATION
Const: AOX3 nontoxic appearing, no apparent respiratory or physical distress. Stable gait   HEENT: NC/AT. Moist mucous membranes.  Eyes: INDIGO. EOMI  Neck: Soft and supple. Full ROM without pain.  Cardiac: Regular rate and regular rhythm. +S1/S2.   Resp: Speaking in full sentences. No evidence of respiratory distress.   Abd: Soft, non-tender, non-distended. Normal bowel sounds in all 4 quadrants. No guarding or rebound.  Back: Spine midline and non-tender. No CVAT.  Skin:  scar over the anterior lateral of the shoulder noted   MSK; right shoulder muscle spasm and tenderness over supraspinatus noted. pain on abduction and external rotation radial  pulse +2  Neuro: Awake, alert & oriented x 3. Moves all extremities symmetrically.

## 2024-02-17 NOTE — ED PROVIDER NOTE - ATTENDING APP SHARED VISIT CONTRIBUTION OF CARE
I, Ravi Johnson, performed a face to face bedside interview with this patient regarding history of present illness, and completed an independent physical examination. I personally made/approved the management plan and take responsibility for the patient management. I have communicated the patient’s plan of care and disposition with the ACP.  Patient presents with shoulder pain after shoveling yesterday.  No trauma, no numbness or tingling  Gen: NAD, well appearing  CV: RRR  Pul: CTA b/l  Neuro: no focal deficits  msk: reduced ROm of the R shoulder due to pain, no deformity  Pt improved, XR neg, stable for dc

## 2024-02-17 NOTE — ED PROVIDER NOTE - PATIENT PORTAL LINK FT
You can access the FollowMyHealth Patient Portal offered by [facility] by registering at the following website: [email or web address]. By joining [employer]’s FollowMyHealth portal, you will also be able to view your health information using other applications (apps) compatible with our system.

## 2024-02-17 NOTE — ED PROVIDER NOTE - CARE PROVIDER_API CALL
Shree Odom  Orthopaedic Surgery  [address] [place] [id number]  Phone: [phone]  Fax: [phone]  Follow Up Time: 1-3 Days

## 2024-02-17 NOTE — ED PROVIDER NOTE - PROGRESS NOTE DETAILS
See the patient at the bedside after x-ray and pain medications state feeling a lot better and range of motion improved.  he denies any hx of kidney disease    history of the right shoulder surgery he has  palatine in his right shoulder recommended follow-up with orthopedic doctor.  radha

## 2024-02-17 NOTE — ED PROVIDER NOTE - NSFOLLOWUPINSTRUCTIONS_ED_ALL_ED_FT
bethel los medicamentos según lo prescrito  llamar y hacer seguimiento con ortopédico también  Dolor en el hombro  Shoulder Pain    Muchas cosas pueden provocar dolor en el hombro, por ejemplo:    Fiona lesión.  Un movimiento del hombro que se repite fiona y otra vez de la misma manera (uso excesivo).  Dolor en las articulaciones (artritis).    El dolor puede deberse a lo siguiente:    Hinchazón e irritación (inflamación) de alguna parte del hombro.  Fiona lesión en la articulación del hombro.  Fiona lesión en:    Los tejidos que conectan el músculo al hueso (tendones).  Los tejidos que conectan los huesos entre sí (ligamentos).  Los huesos.    Siga estas indicaciones en salgado casa:  Controle los cambios en prince síntomas. Informe a salgado médico acerca de los cambios. Estas indicaciones pueden ayudarlo con el dolor.        Si tiene un cabestrillo:    Use el cabestrillo bee se lo haya indicado el médico. Quíteselo solamente bee se lo haya indicado el médico.  Afloje el cabestrillo si los dedos:    Hormiguean.   Se adormecen.   Se tornan fríos y de color maureen.   Mantenga el cabestrillo limpio.  Si el cabestrillo no es impermeable:    No deje que se moje.  Quítese el cabestrillo para ducharse o bañarse.        Control del dolor, la rigidez y la hinchazón     Si se lo indican, aplique hielo sobre la javier dolorida:    Ponga el hielo en fiona bolsa plástica.   Coloque fiona toalla entre la piel y la bolsa.   Coloque el hielo jayy 20 minutos, 2 a 3 veces por día. Deje de aplicarse hielo si no ayuda a aliviar el dolor.  Apriete fiona pelota blanda o fiona almohadilla de goma tanto bee sea posible. [place] impide que el hombro se hinche. También ayuda a fortalecer el brazo.        Indicaciones generales    Nescatunga los medicamentos de venta sarita y los recetados solamente bee se lo haya indicado el médico.  Concurra a todas las visitas de seguimiento bee se lo haya indicado el médico. [place] es importante.    Comuníquese con un médico si:  El dolor empeora.  Los medicamentos no le alivian el dolor.  Siente un dolor nuevo en el brazo, la mano o los dedos.    Solicite ayuda inmediatamente si:  El brazo, la mano o los dedos:    Hormiguean.  Están adormecidos.  Están hinchados.  Están doloridos.  Se tornan de color kirk o maureen.    Resumen  Varias pueden ser las causas del dolor en el hombro. Estas incluyen lesiones,  el hombro en el mismo sentido fiona y otra vez, y dolor en las articulaciones.  Controle los cambios en prince síntomas. Informe a salgado médico acerca de los cambios.  Esta afección se puede tratar con un cabestrillo, hielo y un medicamento para el dolor.  Comuníquese con salgado médico si el dolor empeora o tiene un dolor nuevo. Solicite ayuda de inmediato si el brazo, la mano o los dedos se le adormecen o si siente hormigueo, se le hinchan o le duelen.  Concurra a todas las visitas de seguimiento bee se lo haya indicado el médico. [place] es importante.    NOTAS ADICIONALES E INSTRUCCIONES    Please follow up with your Primary MD in 24-48 hr.  Seek immediate medical care for any new/worsening signs or symptoms.

## 2024-02-17 NOTE — ED PROVIDER NOTE - OBJECTIVE STATEMENT
68 years old male past medical history of hypertension, hyperlipidemia presents emergency room complaint of the right shoulder pain x 1 day .  no new fall trauma or injury. he was shoveling the snow day before yesterday and after that the pain begins.  pain across the top of the shoulder radiates to his right side of the neck. denies any numbness or tingling. history of the right shoulder surgery status post injury 20 years ago and he is right-handed. denies any fever or chills, chest pain or shortness of breath, abdominal pain,. took Tylenol last night for the pain did not help
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
## 2024-02-17 NOTE — ED ADULT TRIAGE NOTE - CHIEF COMPLAINT QUOTE
pt c/o right shoulder pain, was shoveling the hard snow last night  A&Ox3, resp wnl, unable to raise right arm

## 2024-05-09 PROBLEM — Z00.00 ENCOUNTER FOR PREVENTIVE HEALTH EXAMINATION: Status: ACTIVE | Noted: 2024-05-09

## 2024-05-10 ENCOUNTER — APPOINTMENT (OUTPATIENT)
Dept: UROLOGY | Facility: CLINIC | Age: 69
End: 2024-05-10
Payer: MEDICARE

## 2024-05-10 VITALS
BODY MASS INDEX: 30.43 KG/M2 | HEART RATE: 61 BPM | DIASTOLIC BLOOD PRESSURE: 77 MMHG | SYSTOLIC BLOOD PRESSURE: 124 MMHG | HEIGHT: 60 IN | WEIGHT: 155 LBS

## 2024-05-10 DIAGNOSIS — Z15.03 GENETIC SUSCEPTIBILITY TO MALIGNANT NEOPLASM OF PROSTATE: ICD-10-CM

## 2024-05-10 DIAGNOSIS — N40.0 BENIGN PROSTATIC HYPERPLASIA WITHOUT LOWER URINARY TRACT SYMPMS: ICD-10-CM

## 2024-05-10 LAB
BILIRUB UR QL STRIP: NORMAL
CLARITY UR: CLEAR
COLLECTION METHOD: NORMAL
GLUCOSE UR-MCNC: NORMAL
HCG UR QL: 0.2 EU/DL
HGB UR QL STRIP.AUTO: ABNORMAL
KETONES UR-MCNC: NORMAL
LEUKOCYTE ESTERASE UR QL STRIP: NORMAL
NITRITE UR QL STRIP: NORMAL
PH UR STRIP: 6.5
PROT UR STRIP-MCNC: NORMAL
SP GR UR STRIP: 1.02

## 2024-05-10 PROCEDURE — 99203 OFFICE O/P NEW LOW 30 MIN: CPT | Mod: 25

## 2024-05-10 PROCEDURE — 81003 URINALYSIS AUTO W/O SCOPE: CPT | Mod: QW

## 2024-05-10 RX ORDER — TAMSULOSIN HYDROCHLORIDE 0.4 MG/1
0.4 CAPSULE ORAL
Qty: 30 | Refills: 1 | Status: ACTIVE | COMMUNITY
Start: 2024-05-10 | End: 1900-01-01

## 2024-06-14 ENCOUNTER — APPOINTMENT (OUTPATIENT)
Dept: UROLOGY | Facility: CLINIC | Age: 69
End: 2024-06-14
Payer: MEDICARE

## 2024-06-14 VITALS
BODY MASS INDEX: 31.22 KG/M2 | WEIGHT: 159 LBS | SYSTOLIC BLOOD PRESSURE: 114 MMHG | HEART RATE: 62 BPM | HEIGHT: 60 IN | DIASTOLIC BLOOD PRESSURE: 67 MMHG | OXYGEN SATURATION: 99 %

## 2024-06-14 DIAGNOSIS — R97.20 ELEVATED PROSTATE, SPECIFIC ANTIGEN [PSA]: ICD-10-CM

## 2024-06-14 LAB
BILIRUB UR QL STRIP: NORMAL
CLARITY UR: CLEAR
COLLECTION METHOD: NORMAL
GLUCOSE UR-MCNC: NORMAL
HCG UR QL: 0.2 EU/DL
HGB UR QL STRIP.AUTO: NORMAL
KETONES UR-MCNC: NORMAL
LEUKOCYTE ESTERASE UR QL STRIP: NORMAL
NITRITE UR QL STRIP: NORMAL
PH UR STRIP: 7
PROT UR STRIP-MCNC: NORMAL
SP GR UR STRIP: 1.02

## 2024-06-14 PROCEDURE — 81003 URINALYSIS AUTO W/O SCOPE: CPT | Mod: QW

## 2024-06-14 PROCEDURE — 99213 OFFICE O/P EST LOW 20 MIN: CPT

## 2024-06-14 NOTE — HISTORY OF PRESENT ILLNESS
[FreeTextEntry1] : voiding better on tamsulosin, Had labs [Dysuria] : no dysuria [Hematuria - Gross] : no gross hematuria [None] : None

## 2024-06-14 NOTE — ASSESSMENT
[FreeTextEntry1] : PSA 9.7.  Advise prostate MRI in preparation for possible prostate biopsy/fusion biopsy.  RDP referral don.  All instructions given, questions answered

## 2024-06-15 NOTE — HISTORY OF PRESENT ILLNESS
[FreeTextEntry1] : poor historian referred for prostate evaluation [Urinary Frequency] : urinary frequency [Nocturia] : nocturia [Dysuria] : no dysuria [Hematuria - Gross] : no gross hematuria [None] : None

## 2024-06-15 NOTE — PHYSICAL EXAM
[Prostate Size ___ (0-4)] : prostate size [unfilled] (scale: 0-4) [General Appearance - Well Developed] : well developed [General Appearance - Well Nourished] : well nourished [General Appearance - In No Acute Distress] : no acute distress [] : no respiratory distress [Oriented To Time, Place, And Person] : oriented to person, place, and time

## 2024-06-15 NOTE — ASSESSMENT
[FreeTextEntry1] : u/a and prior studies discussed.  Trial of tamsulosin .4mg po daily, precautions reviewed and f/u with PSA

## 2024-06-17 ENCOUNTER — NON-APPOINTMENT (OUTPATIENT)
Age: 69
End: 2024-06-17

## 2024-07-01 LAB
PSA FREE FLD-MCNC: 12 %
PSA FREE SERPL-MCNC: 0.86 NG/ML
PSA SERPL-MCNC: 7.31 NG/ML

## 2024-07-01 RX ORDER — ENEMA 19; 7 G/133ML; G/133ML
7-19 ENEMA RECTAL
Qty: 2 | Refills: 0 | Status: ACTIVE | COMMUNITY
Start: 2024-07-01 | End: 1900-01-01

## 2024-07-24 ENCOUNTER — APPOINTMENT (OUTPATIENT)
Dept: UROLOGY | Facility: CLINIC | Age: 69
End: 2024-07-24

## 2024-07-25 ENCOUNTER — APPOINTMENT (OUTPATIENT)
Dept: MRI IMAGING | Facility: CLINIC | Age: 69
End: 2024-07-25

## 2024-08-21 ENCOUNTER — APPOINTMENT (OUTPATIENT)
Dept: UROLOGY | Facility: CLINIC | Age: 69
End: 2024-08-21

## 2025-06-08 ENCOUNTER — INPATIENT (INPATIENT)
Facility: HOSPITAL | Age: 70
LOS: 1 days | Discharge: HOME | DRG: 390 | End: 2025-06-10
Attending: STUDENT IN AN ORGANIZED HEALTH CARE EDUCATION/TRAINING PROGRAM | Admitting: STUDENT IN AN ORGANIZED HEALTH CARE EDUCATION/TRAINING PROGRAM
Payer: MEDICARE

## 2025-06-08 VITALS
SYSTOLIC BLOOD PRESSURE: 124 MMHG | DIASTOLIC BLOOD PRESSURE: 82 MMHG | WEIGHT: 164.91 LBS | TEMPERATURE: 98 F | RESPIRATION RATE: 18 BRPM | HEART RATE: 81 BPM | OXYGEN SATURATION: 100 %

## 2025-06-08 DIAGNOSIS — Z98.890 OTHER SPECIFIED POSTPROCEDURAL STATES: Chronic | ICD-10-CM

## 2025-06-08 LAB
ALBUMIN SERPL ELPH-MCNC: 5 G/DL — SIGNIFICANT CHANGE UP (ref 3.3–5.2)
ALP SERPL-CCNC: 110 U/L — SIGNIFICANT CHANGE UP (ref 40–120)
ALT FLD-CCNC: 33 U/L — SIGNIFICANT CHANGE UP
ANION GAP SERPL CALC-SCNC: 20 MMOL/L — HIGH (ref 5–17)
APTT BLD: 26.2 SEC — SIGNIFICANT CHANGE UP (ref 26.1–36.8)
AST SERPL-CCNC: 32 U/L — SIGNIFICANT CHANGE UP
BASOPHILS # BLD AUTO: 0.06 K/UL — SIGNIFICANT CHANGE UP (ref 0–0.2)
BASOPHILS NFR BLD AUTO: 0.4 % — SIGNIFICANT CHANGE UP (ref 0–2)
BILIRUB SERPL-MCNC: 0.9 MG/DL — SIGNIFICANT CHANGE UP (ref 0.4–2)
BUN SERPL-MCNC: 19.6 MG/DL — SIGNIFICANT CHANGE UP (ref 8–20)
CALCIUM SERPL-MCNC: 10.3 MG/DL — SIGNIFICANT CHANGE UP (ref 8.4–10.5)
CHLORIDE SERPL-SCNC: 91 MMOL/L — LOW (ref 96–108)
CO2 SERPL-SCNC: 24 MMOL/L — SIGNIFICANT CHANGE UP (ref 22–29)
CREAT SERPL-MCNC: 0.73 MG/DL — SIGNIFICANT CHANGE UP (ref 0.5–1.3)
EGFR: 98 ML/MIN/1.73M2 — SIGNIFICANT CHANGE UP
EGFR: 98 ML/MIN/1.73M2 — SIGNIFICANT CHANGE UP
EOSINOPHIL # BLD AUTO: 0.02 K/UL — SIGNIFICANT CHANGE UP (ref 0–0.5)
EOSINOPHIL NFR BLD AUTO: 0.1 % — SIGNIFICANT CHANGE UP (ref 0–6)
GAS PNL BLDV: SIGNIFICANT CHANGE UP
GLUCOSE SERPL-MCNC: 129 MG/DL — HIGH (ref 70–99)
HCT VFR BLD CALC: 48.9 % — SIGNIFICANT CHANGE UP (ref 39–50)
HGB BLD-MCNC: 17.3 G/DL — HIGH (ref 13–17)
IMM GRANULOCYTES # BLD AUTO: 0.09 K/UL — HIGH (ref 0–0.07)
IMM GRANULOCYTES NFR BLD AUTO: 0.7 % — SIGNIFICANT CHANGE UP (ref 0–0.9)
INR BLD: 0.97 RATIO — SIGNIFICANT CHANGE UP (ref 0.85–1.16)
LIDOCAIN IGE QN: 97 U/L — HIGH (ref 22–51)
LYMPHOCYTES # BLD AUTO: 0.87 K/UL — LOW (ref 1–3.3)
LYMPHOCYTES NFR BLD AUTO: 6.4 % — LOW (ref 13–44)
MCHC RBC-ENTMCNC: 29.7 PG — SIGNIFICANT CHANGE UP (ref 27–34)
MCHC RBC-ENTMCNC: 35.4 G/DL — SIGNIFICANT CHANGE UP (ref 32–36)
MCV RBC AUTO: 83.9 FL — SIGNIFICANT CHANGE UP (ref 80–100)
MONOCYTES # BLD AUTO: 0.7 K/UL — SIGNIFICANT CHANGE UP (ref 0–0.9)
MONOCYTES NFR BLD AUTO: 5.2 % — SIGNIFICANT CHANGE UP (ref 2–14)
NEUTROPHILS # BLD AUTO: 11.77 K/UL — HIGH (ref 1.8–7.4)
NEUTROPHILS NFR BLD AUTO: 87.2 % — HIGH (ref 43–77)
NRBC # BLD AUTO: 0 K/UL — SIGNIFICANT CHANGE UP (ref 0–0)
NRBC # FLD: 0 K/UL — SIGNIFICANT CHANGE UP (ref 0–0)
NRBC BLD AUTO-RTO: 0 /100 WBCS — SIGNIFICANT CHANGE UP (ref 0–0)
PLATELET # BLD AUTO: 236 K/UL — SIGNIFICANT CHANGE UP (ref 150–400)
PMV BLD: 11.2 FL — SIGNIFICANT CHANGE UP (ref 7–13)
POTASSIUM SERPL-MCNC: 3.7 MMOL/L — SIGNIFICANT CHANGE UP (ref 3.5–5.3)
POTASSIUM SERPL-SCNC: 3.7 MMOL/L — SIGNIFICANT CHANGE UP (ref 3.5–5.3)
PROT SERPL-MCNC: 8.3 G/DL — SIGNIFICANT CHANGE UP (ref 6.6–8.7)
PROTHROM AB SERPL-ACNC: 11 SEC — SIGNIFICANT CHANGE UP (ref 9.9–13.4)
RBC # BLD: 5.83 M/UL — HIGH (ref 4.2–5.8)
RBC # FLD: 13.1 % — SIGNIFICANT CHANGE UP (ref 10.3–14.5)
SODIUM SERPL-SCNC: 135 MMOL/L — SIGNIFICANT CHANGE UP (ref 135–145)
WBC # BLD: 13.51 K/UL — HIGH (ref 3.8–10.5)
WBC # FLD AUTO: 13.51 K/UL — HIGH (ref 3.8–10.5)

## 2025-06-08 PROCEDURE — 99053 MED SERV 10PM-8AM 24 HR FAC: CPT

## 2025-06-08 PROCEDURE — 99285 EMERGENCY DEPT VISIT HI MDM: CPT

## 2025-06-08 RX ORDER — ONDANSETRON HCL/PF 4 MG/2 ML
4 VIAL (ML) INJECTION ONCE
Refills: 0 | Status: COMPLETED | OUTPATIENT
Start: 2025-06-08 | End: 2025-06-08

## 2025-06-08 RX ADMIN — Medication 4 MILLIGRAM(S): at 23:29

## 2025-06-08 RX ADMIN — Medication 4 MILLIGRAM(S): at 23:28

## 2025-06-08 RX ADMIN — Medication 1000 MILLILITER(S): at 23:28

## 2025-06-08 NOTE — ED PROVIDER NOTE - PHYSICAL EXAMINATION
Gen: appears uncomfortable  Head: NC/AT  Neck: trachea midline  Card: regular rate and rhythm  Resp:  CTAB  Abd: L sided abd tenderness  Ext: no deformities  Neuro: A&Ox3, GODDARD spontaneously, sensation intact and symmetrical b/l extremities, no facial droop, no slurred speech, EOMI  Skin:  Warm and dry as visualized  Psych:  Normal affect and mood
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
## 2025-06-08 NOTE — ED PROVIDER NOTE - PROGRESS NOTE DETAILS
Given the significant and immediate threats to this patient based on initial presentation, the benefits of emergency contrast-enhanced CT imaging without obtaining GFR/creatinine serum level results greatly outweigh the potential risk of harm due to contrast-induced nephropathy. -Farshad

## 2025-06-08 NOTE — ED PROVIDER NOTE - CLINICAL SUMMARY MEDICAL DECISION MAKING FREE TEXT BOX
69-year-old male patient past medical history of hypertension, hyperlipidemia, abdominal surgery presenting with left-sided abdominal pain and vomiting today.  He denies fever, chills, urinary symptoms, diarrhea, chest pain, back pain, numbness, tingling, focal weakness, or any other complaints.  1 episode of vomiting that was nonbloody nonbilious. On exam midline abdominal scar with tenderness in the left side.  Patient ordered for labs, urine, CT abdomen pelvis, pain control, antiemetics, fluids. 69-year-old male patient past medical history of hypertension, hyperlipidemia, abdominal surgery presenting with left-sided abdominal pain and vomiting today.  He denies fever, chills, urinary symptoms, diarrhea, chest pain, back pain, numbness, tingling, focal weakness, or any other complaints.  1 episode of vomiting that was nonbloody nonbilious. On exam midline abdominal scar with tenderness in the left side.  Patient ordered for labs, urine, CT abdomen pelvis, pain control, antiemetics, fluids. Last BM this morning. 69-year-old male patient past medical history of hypertension, hyperlipidemia, abdominal surgery presenting with left-sided abdominal pain and vomiting today.  He denies fever, chills, urinary symptoms, diarrhea, chest pain, back pain, numbness, tingling, focal weakness, or any other complaints.  1 episode of vomiting that was nonbloody nonbilious. On exam midline abdominal scar with tenderness in the left side.  Patient ordered for labs, urine, CT abdomen pelvis, pain control, antiemetics, fluids. Last BM this morning. SBO on CT. pt admitted to surgery.
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
## 2025-06-09 DIAGNOSIS — Z98.890 OTHER SPECIFIED POSTPROCEDURAL STATES: Chronic | ICD-10-CM

## 2025-06-09 DIAGNOSIS — K56.609 UNSPECIFIED INTESTINAL OBSTRUCTION, UNSPECIFIED AS TO PARTIAL VERSUS COMPLETE OBSTRUCTION: ICD-10-CM

## 2025-06-09 DIAGNOSIS — Z93.9 ARTIFICIAL OPENING STATUS, UNSPECIFIED: Chronic | ICD-10-CM

## 2025-06-09 LAB
ANION GAP SERPL CALC-SCNC: 15 MMOL/L — SIGNIFICANT CHANGE UP (ref 5–17)
APPEARANCE UR: CLEAR — SIGNIFICANT CHANGE UP
BACTERIA # UR AUTO: NEGATIVE /HPF — SIGNIFICANT CHANGE UP
BILIRUB UR-MCNC: NEGATIVE — SIGNIFICANT CHANGE UP
BLD GP AB SCN SERPL QL: SIGNIFICANT CHANGE UP
BUN SERPL-MCNC: 16.4 MG/DL — SIGNIFICANT CHANGE UP (ref 8–20)
CALCIUM SERPL-MCNC: 8.9 MG/DL — SIGNIFICANT CHANGE UP (ref 8.4–10.5)
CAST: 1 /LPF — SIGNIFICANT CHANGE UP (ref 0–4)
CHLORIDE SERPL-SCNC: 96 MMOL/L — SIGNIFICANT CHANGE UP (ref 96–108)
CO2 SERPL-SCNC: 27 MMOL/L — SIGNIFICANT CHANGE UP (ref 22–29)
COLOR SPEC: YELLOW — SIGNIFICANT CHANGE UP
CREAT SERPL-MCNC: 0.78 MG/DL — SIGNIFICANT CHANGE UP (ref 0.5–1.3)
DIFF PNL FLD: NEGATIVE — SIGNIFICANT CHANGE UP
EGFR: 96 ML/MIN/1.73M2 — SIGNIFICANT CHANGE UP
EGFR: 96 ML/MIN/1.73M2 — SIGNIFICANT CHANGE UP
GLUCOSE SERPL-MCNC: 142 MG/DL — HIGH (ref 70–99)
GLUCOSE UR QL: NEGATIVE MG/DL — SIGNIFICANT CHANGE UP
HCT VFR BLD CALC: 44.2 % — SIGNIFICANT CHANGE UP (ref 39–50)
HGB BLD-MCNC: 15.5 G/DL — SIGNIFICANT CHANGE UP (ref 13–17)
KETONES UR QL: NEGATIVE MG/DL — SIGNIFICANT CHANGE UP
LACTATE SERPL-SCNC: 1.4 MMOL/L — SIGNIFICANT CHANGE UP (ref 0.5–2)
LACTATE SERPL-SCNC: 3 MMOL/L — HIGH (ref 0.5–2)
LEUKOCYTE ESTERASE UR-ACNC: NEGATIVE — SIGNIFICANT CHANGE UP
MAGNESIUM SERPL-MCNC: 2.4 MG/DL — SIGNIFICANT CHANGE UP (ref 1.6–2.6)
MCHC RBC-ENTMCNC: 29.7 PG — SIGNIFICANT CHANGE UP (ref 27–34)
MCHC RBC-ENTMCNC: 35.1 G/DL — SIGNIFICANT CHANGE UP (ref 32–36)
MCV RBC AUTO: 84.7 FL — SIGNIFICANT CHANGE UP (ref 80–100)
NITRITE UR-MCNC: NEGATIVE — SIGNIFICANT CHANGE UP
NRBC # BLD AUTO: 0 K/UL — SIGNIFICANT CHANGE UP (ref 0–0)
NRBC # FLD: 0 K/UL — SIGNIFICANT CHANGE UP (ref 0–0)
NRBC BLD AUTO-RTO: 0 /100 WBCS — SIGNIFICANT CHANGE UP (ref 0–0)
PH UR: >=9 (ref 5–8)
PHOSPHATE SERPL-MCNC: 3.4 MG/DL — SIGNIFICANT CHANGE UP (ref 2.4–4.7)
PLATELET # BLD AUTO: 203 K/UL — SIGNIFICANT CHANGE UP (ref 150–400)
PMV BLD: 10.8 FL — SIGNIFICANT CHANGE UP (ref 7–13)
POTASSIUM SERPL-MCNC: 3.5 MMOL/L — SIGNIFICANT CHANGE UP (ref 3.5–5.3)
POTASSIUM SERPL-SCNC: 3.5 MMOL/L — SIGNIFICANT CHANGE UP (ref 3.5–5.3)
PROT UR-MCNC: 100 MG/DL
RBC # BLD: 5.22 M/UL — SIGNIFICANT CHANGE UP (ref 4.2–5.8)
RBC # FLD: 13.2 % — SIGNIFICANT CHANGE UP (ref 10.3–14.5)
RBC CASTS # UR COMP ASSIST: 1 /HPF — SIGNIFICANT CHANGE UP (ref 0–4)
SODIUM SERPL-SCNC: 137 MMOL/L — SIGNIFICANT CHANGE UP (ref 135–145)
SP GR SPEC: >1.03 — HIGH (ref 1–1.03)
SQUAMOUS # UR AUTO: 0 /HPF — SIGNIFICANT CHANGE UP (ref 0–5)
UROBILINOGEN FLD QL: 0.2 MG/DL — SIGNIFICANT CHANGE UP (ref 0.2–1)
WBC # BLD: 11.19 K/UL — HIGH (ref 3.8–10.5)
WBC # FLD AUTO: 11.19 K/UL — HIGH (ref 3.8–10.5)
WBC UR QL: 0 /HPF — SIGNIFICANT CHANGE UP (ref 0–5)

## 2025-06-09 PROCEDURE — 74018 RADEX ABDOMEN 1 VIEW: CPT | Mod: 26,77

## 2025-06-09 PROCEDURE — 74177 CT ABD & PELVIS W/CONTRAST: CPT | Mod: 26

## 2025-06-09 PROCEDURE — 74018 RADEX ABDOMEN 1 VIEW: CPT | Mod: 26,76

## 2025-06-09 PROCEDURE — 99284 EMERGENCY DEPT VISIT MOD MDM: CPT

## 2025-06-09 RX ORDER — SODIUM CHLORIDE 9 G/1000ML
1000 INJECTION, SOLUTION INTRAVENOUS
Refills: 0 | Status: DISCONTINUED | OUTPATIENT
Start: 2025-06-09 | End: 2025-06-10

## 2025-06-09 RX ORDER — ENOXAPARIN SODIUM 100 MG/ML
40 INJECTION SUBCUTANEOUS EVERY 24 HOURS
Refills: 0 | Status: DISCONTINUED | OUTPATIENT
Start: 2025-06-09 | End: 2025-06-10

## 2025-06-09 RX ORDER — SODIUM CHLORIDE 9 G/1000ML
1000 INJECTION, SOLUTION INTRAVENOUS ONCE
Refills: 0 | Status: COMPLETED | OUTPATIENT
Start: 2025-06-09 | End: 2025-06-09

## 2025-06-09 RX ORDER — LOSARTAN POTASSIUM AND HYDROCHLOROTHIAZIDE 12.5; 5 MG/1; MG/1
1 TABLET ORAL
Refills: 0 | DISCHARGE

## 2025-06-09 RX ADMIN — SODIUM CHLORIDE 1000 MILLILITER(S): 9 INJECTION, SOLUTION INTRAVENOUS at 04:17

## 2025-06-09 RX ADMIN — Medication 100 MILLIEQUIVALENT(S): at 10:12

## 2025-06-09 RX ADMIN — SODIUM CHLORIDE 125 MILLILITER(S): 9 INJECTION, SOLUTION INTRAVENOUS at 06:23

## 2025-06-09 RX ADMIN — Medication 100 MILLIEQUIVALENT(S): at 09:08

## 2025-06-09 RX ADMIN — ENOXAPARIN SODIUM 40 MILLIGRAM(S): 100 INJECTION SUBCUTANEOUS at 13:11

## 2025-06-09 RX ADMIN — Medication 4 MILLIGRAM(S): at 02:27

## 2025-06-09 RX ADMIN — Medication 100 MILLIEQUIVALENT(S): at 08:03

## 2025-06-09 RX ADMIN — SODIUM CHLORIDE 125 MILLILITER(S): 9 INJECTION, SOLUTION INTRAVENOUS at 09:08

## 2025-06-09 NOTE — ED ADULT NURSE REASSESSMENT NOTE - NS ED NURSE REASSESS COMMENT FT1
Assumed care of patient at 0730. A&Ox3, connected to NG tube, draining without difficulty. Observed resting in bed comfortably. Breathing is spontaneous even and unlabored. Denies any current symptoms/ abdominal pain. Bed is in lowest position and side rails up. Safety precautions maintained. Report given to Kavitha in CDU, surgical any bed, pt aware of plan of care.

## 2025-06-09 NOTE — PATIENT PROFILE ADULT - FALL HARM RISK - HARM RISK INTERVENTIONS

## 2025-06-09 NOTE — H&P ADULT - NSICDXPASTSURGICALHX_GEN_ALL_CORE_FT
PAST SURGICAL HISTORY:  H/O exploratory laparotomy     H/O shoulder surgery     History of bowel resection     History of creation of ostomy
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
## 2025-06-09 NOTE — PATIENT PROFILE ADULT - NSPROSPHOSPCHAPLAINYN_GEN_A_NUR
[FreeTextEntry1] : 63-year-old male Several year history of ileal Crohn's Continues to do well on Entyvio maintenance every 8 weeks Last colonoscopy over 3 1/2  years ago  denies any complaints   no joint pain  , eye issues .    Social history: retired from [employer], [employer] 
no
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
## 2025-06-09 NOTE — ED ADULT NURSE NOTE - OBJECTIVE STATEMENT
PT is a 70yo M c/o LLQ abd pain. Pt states int sharp pain came on suddenly and disappears, pt endorses nausea and one episode of vomiting. PMH of HLD, abd surgery. PT A,Ox4, ambulatory at baseline. Respirations even and unlabored, abd firm, distended and tender, skin warm, dry and intact, GODDARD. Denies HA, CP, SOB, fever, chills and urinary symptoms. Stretcher locked in lowest position, appropriate side rails up for safety, pt instructed to call for RN if anything needed.

## 2025-06-10 ENCOUNTER — TRANSCRIPTION ENCOUNTER (OUTPATIENT)
Age: 70
End: 2025-06-10

## 2025-06-10 VITALS
OXYGEN SATURATION: 95 % | SYSTOLIC BLOOD PRESSURE: 136 MMHG | DIASTOLIC BLOOD PRESSURE: 75 MMHG | TEMPERATURE: 98 F | RESPIRATION RATE: 18 BRPM | HEART RATE: 68 BPM

## 2025-06-10 LAB
ANION GAP SERPL CALC-SCNC: 11 MMOL/L — SIGNIFICANT CHANGE UP (ref 5–17)
BASOPHILS # BLD AUTO: 0.02 K/UL — SIGNIFICANT CHANGE UP (ref 0–0.2)
BASOPHILS NFR BLD AUTO: 0.3 % — SIGNIFICANT CHANGE UP (ref 0–2)
BUN SERPL-MCNC: 18.4 MG/DL — SIGNIFICANT CHANGE UP (ref 8–20)
CALCIUM SERPL-MCNC: 8.1 MG/DL — LOW (ref 8.4–10.5)
CHLORIDE SERPL-SCNC: 106 MMOL/L — SIGNIFICANT CHANGE UP (ref 96–108)
CO2 SERPL-SCNC: 23 MMOL/L — SIGNIFICANT CHANGE UP (ref 22–29)
CREAT SERPL-MCNC: 0.63 MG/DL — SIGNIFICANT CHANGE UP (ref 0.5–1.3)
EGFR: 102 ML/MIN/1.73M2 — SIGNIFICANT CHANGE UP
EGFR: 102 ML/MIN/1.73M2 — SIGNIFICANT CHANGE UP
EOSINOPHIL # BLD AUTO: 0.21 K/UL — SIGNIFICANT CHANGE UP (ref 0–0.5)
EOSINOPHIL NFR BLD AUTO: 3 % — SIGNIFICANT CHANGE UP (ref 0–6)
GLUCOSE SERPL-MCNC: 91 MG/DL — SIGNIFICANT CHANGE UP (ref 70–99)
HCT VFR BLD CALC: 42 % — SIGNIFICANT CHANGE UP (ref 39–50)
HGB BLD-MCNC: 14 G/DL — SIGNIFICANT CHANGE UP (ref 13–17)
IMM GRANULOCYTES # BLD AUTO: 0.03 K/UL — SIGNIFICANT CHANGE UP (ref 0–0.07)
IMM GRANULOCYTES NFR BLD AUTO: 0.4 % — SIGNIFICANT CHANGE UP (ref 0–0.9)
LYMPHOCYTES # BLD AUTO: 1.44 K/UL — SIGNIFICANT CHANGE UP (ref 1–3.3)
LYMPHOCYTES NFR BLD AUTO: 20.7 % — SIGNIFICANT CHANGE UP (ref 13–44)
MAGNESIUM SERPL-MCNC: 2.5 MG/DL — SIGNIFICANT CHANGE UP (ref 1.6–2.6)
MCHC RBC-ENTMCNC: 29.7 PG — SIGNIFICANT CHANGE UP (ref 27–34)
MCHC RBC-ENTMCNC: 33.3 G/DL — SIGNIFICANT CHANGE UP (ref 32–36)
MCV RBC AUTO: 89.2 FL — SIGNIFICANT CHANGE UP (ref 80–100)
MONOCYTES # BLD AUTO: 0.73 K/UL — SIGNIFICANT CHANGE UP (ref 0–0.9)
MONOCYTES NFR BLD AUTO: 10.5 % — SIGNIFICANT CHANGE UP (ref 2–14)
NEUTROPHILS # BLD AUTO: 4.54 K/UL — SIGNIFICANT CHANGE UP (ref 1.8–7.4)
NEUTROPHILS NFR BLD AUTO: 65.1 % — SIGNIFICANT CHANGE UP (ref 43–77)
NRBC # BLD AUTO: 0 K/UL — SIGNIFICANT CHANGE UP (ref 0–0)
NRBC # FLD: 0 K/UL — SIGNIFICANT CHANGE UP (ref 0–0)
NRBC BLD AUTO-RTO: 0 /100 WBCS — SIGNIFICANT CHANGE UP (ref 0–0)
PHOSPHATE SERPL-MCNC: 2 MG/DL — LOW (ref 2.4–4.7)
PLATELET # BLD AUTO: 178 K/UL — SIGNIFICANT CHANGE UP (ref 150–400)
PMV BLD: 11.4 FL — SIGNIFICANT CHANGE UP (ref 7–13)
POTASSIUM SERPL-MCNC: 4.1 MMOL/L — SIGNIFICANT CHANGE UP (ref 3.5–5.3)
POTASSIUM SERPL-SCNC: 4.1 MMOL/L — SIGNIFICANT CHANGE UP (ref 3.5–5.3)
RBC # BLD: 4.71 M/UL — SIGNIFICANT CHANGE UP (ref 4.2–5.8)
RBC # FLD: 14.2 % — SIGNIFICANT CHANGE UP (ref 10.3–14.5)
SODIUM SERPL-SCNC: 140 MMOL/L — SIGNIFICANT CHANGE UP (ref 135–145)
WBC # BLD: 6.97 K/UL — SIGNIFICANT CHANGE UP (ref 3.8–10.5)
WBC # FLD AUTO: 6.97 K/UL — SIGNIFICANT CHANGE UP (ref 3.8–10.5)

## 2025-06-10 PROCEDURE — 83735 ASSAY OF MAGNESIUM: CPT

## 2025-06-10 PROCEDURE — 85027 COMPLETE CBC AUTOMATED: CPT

## 2025-06-10 PROCEDURE — 99232 SBSQ HOSP IP/OBS MODERATE 35: CPT

## 2025-06-10 PROCEDURE — 96375 TX/PRO/DX INJ NEW DRUG ADDON: CPT

## 2025-06-10 PROCEDURE — 87086 URINE CULTURE/COLONY COUNT: CPT

## 2025-06-10 PROCEDURE — 86850 RBC ANTIBODY SCREEN: CPT

## 2025-06-10 PROCEDURE — 84295 ASSAY OF SERUM SODIUM: CPT

## 2025-06-10 PROCEDURE — 96374 THER/PROPH/DIAG INJ IV PUSH: CPT

## 2025-06-10 PROCEDURE — 83690 ASSAY OF LIPASE: CPT

## 2025-06-10 PROCEDURE — 96376 TX/PRO/DX INJ SAME DRUG ADON: CPT

## 2025-06-10 PROCEDURE — 86901 BLOOD TYPING SEROLOGIC RH(D): CPT

## 2025-06-10 PROCEDURE — 36415 COLL VENOUS BLD VENIPUNCTURE: CPT

## 2025-06-10 PROCEDURE — 82803 BLOOD GASES ANY COMBINATION: CPT

## 2025-06-10 PROCEDURE — 74018 RADEX ABDOMEN 1 VIEW: CPT

## 2025-06-10 PROCEDURE — 84132 ASSAY OF SERUM POTASSIUM: CPT

## 2025-06-10 PROCEDURE — 84100 ASSAY OF PHOSPHORUS: CPT

## 2025-06-10 PROCEDURE — 80053 COMPREHEN METABOLIC PANEL: CPT

## 2025-06-10 PROCEDURE — 85730 THROMBOPLASTIN TIME PARTIAL: CPT

## 2025-06-10 PROCEDURE — 85014 HEMATOCRIT: CPT

## 2025-06-10 PROCEDURE — 81001 URINALYSIS AUTO W/SCOPE: CPT

## 2025-06-10 PROCEDURE — 85018 HEMOGLOBIN: CPT

## 2025-06-10 PROCEDURE — 83605 ASSAY OF LACTIC ACID: CPT

## 2025-06-10 PROCEDURE — T1013: CPT

## 2025-06-10 PROCEDURE — 74177 CT ABD & PELVIS W/CONTRAST: CPT

## 2025-06-10 PROCEDURE — 80048 BASIC METABOLIC PNL TOTAL CA: CPT

## 2025-06-10 PROCEDURE — 86900 BLOOD TYPING SEROLOGIC ABO: CPT

## 2025-06-10 PROCEDURE — 82330 ASSAY OF CALCIUM: CPT

## 2025-06-10 PROCEDURE — 85025 COMPLETE CBC W/AUTO DIFF WBC: CPT

## 2025-06-10 PROCEDURE — 82435 ASSAY OF BLOOD CHLORIDE: CPT

## 2025-06-10 PROCEDURE — 99285 EMERGENCY DEPT VISIT HI MDM: CPT

## 2025-06-10 PROCEDURE — 85610 PROTHROMBIN TIME: CPT

## 2025-06-10 PROCEDURE — 82947 ASSAY GLUCOSE BLOOD QUANT: CPT

## 2025-06-10 RX ORDER — SOD PHOS DI, MONO/K PHOS MONO 250 MG
2 TABLET ORAL
Refills: 0 | Status: COMPLETED | OUTPATIENT
Start: 2025-06-10 | End: 2025-06-10

## 2025-06-10 RX ADMIN — SODIUM CHLORIDE 125 MILLILITER(S): 9 INJECTION, SOLUTION INTRAVENOUS at 05:43

## 2025-06-10 NOTE — PROGRESS NOTE ADULT - SUBJECTIVE AND OBJECTIVE BOX
Subjective: Patient seen and examined at bedside, no acute complaints or overnight events, he is tolerating a diet, having bowel function, without nausea or vomiting.     MEDICATIONS  (STANDING):  enoxaparin Injectable 40 milliGRAM(s) SubCutaneous every 24 hours  lactated ringers. 1000 milliLiter(s) (125 mL/Hr) IV Continuous <Continuous>  potassium phosphate / sodium phosphate Powder (PHOS-NaK) 2 Packet(s) Oral every 2 hours    Vital Signs Last 24 Hrs  T(C): 36.7 (10 Keenan 2025 04:36), Max: 36.8 (09 Jun 2025 11:38)  T(F): 98 (10 Keenan 2025 04:36), Max: 98.3 (09 Jun 2025 11:38)  HR: 72 (10 Keenan 2025 04:36) (65 - 100)  BP: 136/86 (10 Keenan 2025 04:36) (119/62 - 158/101)  RR: 18 (10 Keenan 2025 04:36) (17 - 18)  SpO2: 97% (10 Keenan 2025 04:36) (93% - 98%)  Parameters below as of 10 Keenan 2025 04:36  Patient On (Oxygen Delivery Method): room air    Physical Exam:  Constitutional: NAD  HEENT: PERRL, EOMI  Respiratory: Respirations non-labored, no accessory muscle use  Gastrointestinal: Soft, non-tender, non-distended  Neurological: A&O x 3; without gross deficit    LABS:                   14.0   6.97  )-----------( 178      ( 10 Keenan 2025 04:43 )             42.0   06-10  140  |  106  |  18.4  ----------------------------<  91  4.1   |  23.0  |  0.63  Ca    8.1[L]      10 Keenan 2025 04:43  Phos  2.0     06-10  Mg     2.5     06-10  TPro  8.3  /  Alb  5.0  /  TBili  0.9  /  DBili  x   /  AST  32  /  ALT  33  /  AlkPhos  110  06-08  PT/INR - ( 08 Jun 2025 23:14 )   PT: 11.0 sec;   INR: 0.97 ratio      A: Patient is a 70 yo M who presented with an SBO, managed conservatively, now tolerating a regular diet and having bowel function.     Plan:   regular diet   encourage ambulation oob and IS   DVT ppx with SCDs and Lovenox   monitor diet tolerance and bowel function   DC planning to home today

## 2025-06-10 NOTE — DISCHARGE NOTE NURSING/CASE MANAGEMENT/SOCIAL WORK - NSDCVIVACCINE_GEN_ALL_CORE_FT
Tdap; 03-Dec-2018 11:11; Adelita Diego (MATTHEW); [employer]; [id number] (Exp. Date: 02-Nov-2020); IntraMuscular; Deltoid Left.; 0.5 milliLiter(s); VIS (VIS Published: 09-May-2013, VIS Presented: 03-Dec-2018);

## 2025-06-10 NOTE — DISCHARGE NOTE PROVIDER - NSDCMRMEDTOKEN_GEN_ALL_CORE_FT
atorvastatin 80 mg oral tablet: 1 tab(s) orally once a day (at bedtime)  losartan-hydrochlorothiazide 50 mg-12.5 mg oral tablet: 1 tab(s) orally once a day

## 2025-06-10 NOTE — PROGRESS NOTE ADULT - NS ATTEND AMEND GEN_ALL_CORE FT
Patient seen and examined at bedside. No acute events overnight. Denies any nausea or vomiting. Pain is well controlled. Afebrile. Tolerating diet. reports bowel movements and passing gas. Abdomen is soft non distended, non tender to palpation    post gastrograffin challenge KUB shows contrast in the colon  Advance diet and monitor tolerance  serial abdominal exams  monitor bowel function  SCDs and LVNX for DVT ppx  Will discharge if tolerates regular diet

## 2025-06-10 NOTE — DISCHARGE NOTE NURSING/CASE MANAGEMENT/SOCIAL WORK - FINANCIAL ASSISTANCE
[facility] provides services at a reduced cost to those who are determined to be eligible through [facility]’s financial assistance program. Information regarding [facility]’s financial assistance program can be found by going to [email or web address] or by calling [phone].
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
## 2025-06-10 NOTE — DISCHARGE NOTE PROVIDER - NSDCCPCAREPLAN_GEN_ALL_CORE_FT
PRINCIPAL DISCHARGE DIAGNOSIS  Diagnosis: SBO (small bowel obstruction)  Assessment and Plan of Treatment: Follow Up: Please call to make an appointment with your primary care physician as per your usual schedule. You may follow up with the Acute Care Surgery clinic on an as needed basis if you would like.   Activity: May return to normal activities as tolerated.  Diet: May continue regular diet.  Medications: Please take all home medications as prescribed by your primary care doctor. You are encouraged to take over-the-counter tylenol and/or ibuprofen for pain relief.  Patient is advised to RETURN TO THE EMERGENCY DEPARTMENT for any of the following - worsening pain, fever/chills, nausea/vomiting, alterned mental status, chest pain, shortness of breath, or any other new/worsening symptoms.

## 2025-06-10 NOTE — DISCHARGE NOTE PROVIDER - NSFOLLOWUPCLINICS_GEN_ALL_ED_FT
[facility] Acute Care Surgery  Acute Care Surgery  [address] [place] [id number]  Phone: [phone]  Fax:

## 2025-06-10 NOTE — DISCHARGE NOTE NURSING/CASE MANAGEMENT/SOCIAL WORK - NSDCPEFALRISK_GEN_ALL_CORE
For information on Fall & Injury Prevention, visit: [email or web address] OR  [email or web address] OR  https://www.cdc.gov/steadi/patient.html

## 2025-06-11 LAB
CULTURE RESULTS: SIGNIFICANT CHANGE UP
SPECIMEN SOURCE: SIGNIFICANT CHANGE UP

## 2025-06-21 ENCOUNTER — EMERGENCY (EMERGENCY)
Facility: HOSPITAL | Age: 70
LOS: 1 days | End: 2025-06-21
Attending: EMERGENCY MEDICINE
Payer: MEDICARE

## 2025-06-21 VITALS
TEMPERATURE: 98 F | HEART RATE: 77 BPM | SYSTOLIC BLOOD PRESSURE: 153 MMHG | DIASTOLIC BLOOD PRESSURE: 87 MMHG | WEIGHT: 161.6 LBS | RESPIRATION RATE: 18 BRPM | OXYGEN SATURATION: 98 %

## 2025-06-21 DIAGNOSIS — Z98.890 OTHER SPECIFIED POSTPROCEDURAL STATES: Chronic | ICD-10-CM

## 2025-06-21 DIAGNOSIS — Z93.9 ARTIFICIAL OPENING STATUS, UNSPECIFIED: Chronic | ICD-10-CM

## 2025-06-21 PROBLEM — I10 ESSENTIAL (PRIMARY) HYPERTENSION: Chronic | Status: ACTIVE | Noted: 2025-06-09

## 2025-06-21 LAB
ALBUMIN SERPL ELPH-MCNC: 4.5 G/DL — SIGNIFICANT CHANGE UP (ref 3.3–5.2)
ALP SERPL-CCNC: 101 U/L — SIGNIFICANT CHANGE UP (ref 40–120)
ALT FLD-CCNC: 31 U/L — SIGNIFICANT CHANGE UP
ANION GAP SERPL CALC-SCNC: 18 MMOL/L — HIGH (ref 5–17)
AST SERPL-CCNC: 40 U/L — HIGH
BASOPHILS # BLD AUTO: 0.04 K/UL — SIGNIFICANT CHANGE UP (ref 0–0.2)
BASOPHILS NFR BLD AUTO: 0.5 % — SIGNIFICANT CHANGE UP (ref 0–2)
BILIRUB SERPL-MCNC: 0.6 MG/DL — SIGNIFICANT CHANGE UP (ref 0.4–2)
BUN SERPL-MCNC: 17.2 MG/DL — SIGNIFICANT CHANGE UP (ref 8–20)
CALCIUM SERPL-MCNC: 9.7 MG/DL — SIGNIFICANT CHANGE UP (ref 8.4–10.5)
CHLORIDE SERPL-SCNC: 97 MMOL/L — SIGNIFICANT CHANGE UP (ref 96–108)
CO2 SERPL-SCNC: 20 MMOL/L — LOW (ref 22–29)
CREAT SERPL-MCNC: 0.6 MG/DL — SIGNIFICANT CHANGE UP (ref 0.5–1.3)
EGFR: 104 ML/MIN/1.73M2 — SIGNIFICANT CHANGE UP
EGFR: 104 ML/MIN/1.73M2 — SIGNIFICANT CHANGE UP
EOSINOPHIL # BLD AUTO: 0.15 K/UL — SIGNIFICANT CHANGE UP (ref 0–0.5)
EOSINOPHIL NFR BLD AUTO: 2 % — SIGNIFICANT CHANGE UP (ref 0–6)
GLUCOSE SERPL-MCNC: 103 MG/DL — HIGH (ref 70–99)
HCT VFR BLD CALC: 46.9 % — SIGNIFICANT CHANGE UP (ref 39–50)
HGB BLD-MCNC: 16.4 G/DL — SIGNIFICANT CHANGE UP (ref 13–17)
IMM GRANULOCYTES # BLD AUTO: 0.03 K/UL — SIGNIFICANT CHANGE UP (ref 0–0.07)
IMM GRANULOCYTES NFR BLD AUTO: 0.4 % — SIGNIFICANT CHANGE UP (ref 0–0.9)
LACTATE SERPL-SCNC: 2.3 MMOL/L — HIGH (ref 0.5–2)
LIDOCAIN IGE QN: 108 U/L — HIGH (ref 22–51)
LYMPHOCYTES # BLD AUTO: 1.5 K/UL — SIGNIFICANT CHANGE UP (ref 1–3.3)
LYMPHOCYTES NFR BLD AUTO: 20.3 % — SIGNIFICANT CHANGE UP (ref 13–44)
MCHC RBC-ENTMCNC: 29.9 PG — SIGNIFICANT CHANGE UP (ref 27–34)
MCHC RBC-ENTMCNC: 35 G/DL — SIGNIFICANT CHANGE UP (ref 32–36)
MCV RBC AUTO: 85.6 FL — SIGNIFICANT CHANGE UP (ref 80–100)
MONOCYTES # BLD AUTO: 0.55 K/UL — SIGNIFICANT CHANGE UP (ref 0–0.9)
MONOCYTES NFR BLD AUTO: 7.4 % — SIGNIFICANT CHANGE UP (ref 2–14)
NEUTROPHILS # BLD AUTO: 5.12 K/UL — SIGNIFICANT CHANGE UP (ref 1.8–7.4)
NEUTROPHILS NFR BLD AUTO: 69.4 % — SIGNIFICANT CHANGE UP (ref 43–77)
NRBC # BLD AUTO: 0 K/UL — SIGNIFICANT CHANGE UP (ref 0–0)
NRBC # FLD: 0 K/UL — SIGNIFICANT CHANGE UP (ref 0–0)
NRBC BLD AUTO-RTO: 0 /100 WBCS — SIGNIFICANT CHANGE UP (ref 0–0)
PLATELET # BLD AUTO: 236 K/UL — SIGNIFICANT CHANGE UP (ref 150–400)
PMV BLD: 11 FL — SIGNIFICANT CHANGE UP (ref 7–13)
POTASSIUM SERPL-MCNC: 5 MMOL/L — SIGNIFICANT CHANGE UP (ref 3.5–5.3)
POTASSIUM SERPL-SCNC: 5 MMOL/L — SIGNIFICANT CHANGE UP (ref 3.5–5.3)
PROT SERPL-MCNC: 7.7 G/DL — SIGNIFICANT CHANGE UP (ref 6.6–8.7)
RBC # BLD: 5.48 M/UL — SIGNIFICANT CHANGE UP (ref 4.2–5.8)
RBC # FLD: 13.2 % — SIGNIFICANT CHANGE UP (ref 10.3–14.5)
SODIUM SERPL-SCNC: 135 MMOL/L — SIGNIFICANT CHANGE UP (ref 135–145)
WBC # BLD: 7.39 K/UL — SIGNIFICANT CHANGE UP (ref 3.8–10.5)
WBC # FLD AUTO: 7.39 K/UL — SIGNIFICANT CHANGE UP (ref 3.8–10.5)

## 2025-06-21 PROCEDURE — 99285 EMERGENCY DEPT VISIT HI MDM: CPT

## 2025-06-21 PROCEDURE — 74177 CT ABD & PELVIS W/CONTRAST: CPT | Mod: 26

## 2025-06-21 PROCEDURE — T1013: CPT

## 2025-06-21 PROCEDURE — 83605 ASSAY OF LACTIC ACID: CPT

## 2025-06-21 PROCEDURE — 83690 ASSAY OF LIPASE: CPT

## 2025-06-21 PROCEDURE — 36415 COLL VENOUS BLD VENIPUNCTURE: CPT

## 2025-06-21 PROCEDURE — 96374 THER/PROPH/DIAG INJ IV PUSH: CPT | Mod: XU

## 2025-06-21 PROCEDURE — 85025 COMPLETE CBC W/AUTO DIFF WBC: CPT

## 2025-06-21 PROCEDURE — 96375 TX/PRO/DX INJ NEW DRUG ADDON: CPT

## 2025-06-21 PROCEDURE — 80053 COMPREHEN METABOLIC PANEL: CPT

## 2025-06-21 PROCEDURE — 99284 EMERGENCY DEPT VISIT MOD MDM: CPT | Mod: 25

## 2025-06-21 PROCEDURE — 74177 CT ABD & PELVIS W/CONTRAST: CPT

## 2025-06-21 RX ORDER — IOHEXOL 350 MG/ML
30 INJECTION, SOLUTION INTRAVENOUS ONCE
Refills: 0 | Status: COMPLETED | OUTPATIENT
Start: 2025-06-21 | End: 2025-06-21

## 2025-06-21 RX ORDER — SODIUM CHLORIDE 9 G/1000ML
1000 INJECTION, SOLUTION INTRAVENOUS ONCE
Refills: 0 | Status: COMPLETED | OUTPATIENT
Start: 2025-06-21 | End: 2025-06-21

## 2025-06-21 RX ORDER — ONDANSETRON HCL/PF 4 MG/2 ML
4 VIAL (ML) INJECTION ONCE
Refills: 0 | Status: COMPLETED | OUTPATIENT
Start: 2025-06-21 | End: 2025-06-21

## 2025-06-21 RX ADMIN — Medication 4 MILLIGRAM(S): at 16:41

## 2025-06-21 RX ADMIN — IOHEXOL 30 MILLILITER(S): 350 INJECTION, SOLUTION INTRAVENOUS at 16:40

## 2025-06-21 RX ADMIN — Medication 6 MILLIGRAM(S): at 16:41

## 2025-06-21 RX ADMIN — SODIUM CHLORIDE 1000 MILLILITER(S): 9 INJECTION, SOLUTION INTRAVENOUS at 16:41
